# Patient Record
Sex: MALE | Race: WHITE | NOT HISPANIC OR LATINO
[De-identification: names, ages, dates, MRNs, and addresses within clinical notes are randomized per-mention and may not be internally consistent; named-entity substitution may affect disease eponyms.]

---

## 2017-02-07 ENCOUNTER — RX RENEWAL (OUTPATIENT)
Age: 72
End: 2017-02-07

## 2017-02-08 ENCOUNTER — RX RENEWAL (OUTPATIENT)
Age: 72
End: 2017-02-08

## 2017-03-03 ENCOUNTER — RX RENEWAL (OUTPATIENT)
Age: 72
End: 2017-03-03

## 2017-03-10 ENCOUNTER — APPOINTMENT (OUTPATIENT)
Dept: RHEUMATOLOGY | Facility: CLINIC | Age: 72
End: 2017-03-10

## 2017-03-10 VITALS — OXYGEN SATURATION: 96 % | HEART RATE: 66 BPM | SYSTOLIC BLOOD PRESSURE: 118 MMHG | DIASTOLIC BLOOD PRESSURE: 70 MMHG

## 2017-03-10 VITALS — OXYGEN SATURATION: 97 % | HEART RATE: 68 BPM

## 2017-03-10 DIAGNOSIS — G25.81 RESTLESS LEGS SYNDROME: ICD-10-CM

## 2017-03-10 DIAGNOSIS — M75.81 OTHER SHOULDER LESIONS, RIGHT SHOULDER: ICD-10-CM

## 2017-03-10 RX ORDER — METHYLPRED ACET/NACL,ISO-OS/PF 80 MG/ML
80 VIAL (ML) INJECTION
Qty: 1 | Refills: 0 | Status: COMPLETED | OUTPATIENT
Start: 2017-03-10

## 2017-03-10 RX ORDER — ROPINIROLE HYDROCHLORIDE 4 MG/1
4 TABLET, FILM COATED, EXTENDED RELEASE ORAL
Refills: 0 | Status: ACTIVE | COMMUNITY

## 2017-03-10 RX ORDER — DEXAMETHASONE SODIUM PHOSPHATE 4 MG/ML
4 INJECTION, SOLUTION INTRAMUSCULAR; INTRAVENOUS
Qty: 0 | Refills: 0 | Status: COMPLETED | OUTPATIENT
Start: 2017-03-10

## 2017-03-10 RX ADMIN — METHYLPREDNISOLONE ACETATE 0 MG/ML: 80 INJECTION, SUSPENSION INTRA-ARTICULAR; INTRALESIONAL; INTRAMUSCULAR; SOFT TISSUE at 00:00

## 2017-03-10 RX ADMIN — Medication MG/ML: at 00:00

## 2017-10-19 ENCOUNTER — RX RENEWAL (OUTPATIENT)
Age: 72
End: 2017-10-19

## 2018-05-10 ENCOUNTER — APPOINTMENT (OUTPATIENT)
Dept: RHEUMATOLOGY | Facility: CLINIC | Age: 73
End: 2018-05-10
Payer: MEDICARE

## 2018-05-10 VITALS
WEIGHT: 180 LBS | BODY MASS INDEX: 28.93 KG/M2 | HEIGHT: 66 IN | SYSTOLIC BLOOD PRESSURE: 140 MMHG | OXYGEN SATURATION: 98 % | HEART RATE: 70 BPM | DIASTOLIC BLOOD PRESSURE: 88 MMHG

## 2018-05-10 DIAGNOSIS — R05 COUGH: ICD-10-CM

## 2018-05-10 DIAGNOSIS — H61.20 IMPACTED CERUMEN, UNSPECIFIED EAR: ICD-10-CM

## 2018-05-10 DIAGNOSIS — H92.01 OTALGIA, RIGHT EAR: ICD-10-CM

## 2018-05-10 PROCEDURE — 99213 OFFICE O/P EST LOW 20 MIN: CPT

## 2018-11-07 ENCOUNTER — RECORD ABSTRACTING (OUTPATIENT)
Age: 73
End: 2018-11-07

## 2018-11-07 DIAGNOSIS — R91.8 OTHER NONSPECIFIC ABNORMAL FINDING OF LUNG FIELD: ICD-10-CM

## 2018-11-08 ENCOUNTER — APPOINTMENT (OUTPATIENT)
Dept: THORACIC SURGERY | Facility: CLINIC | Age: 73
End: 2018-11-08
Payer: MEDICARE

## 2018-11-08 VITALS
SYSTOLIC BLOOD PRESSURE: 156 MMHG | TEMPERATURE: 98 F | RESPIRATION RATE: 18 BRPM | WEIGHT: 168 LBS | BODY MASS INDEX: 27 KG/M2 | HEIGHT: 66 IN | DIASTOLIC BLOOD PRESSURE: 94 MMHG | HEART RATE: 88 BPM | OXYGEN SATURATION: 98 %

## 2018-11-08 DIAGNOSIS — Z86.39 PERSONAL HISTORY OF OTHER ENDOCRINE, NUTRITIONAL AND METABOLIC DISEASE: ICD-10-CM

## 2018-11-08 DIAGNOSIS — Z86.69 PERSONAL HISTORY OF OTHER DISEASES OF THE NERVOUS SYSTEM AND SENSE ORGANS: ICD-10-CM

## 2018-11-08 DIAGNOSIS — Z87.898 PERSONAL HISTORY OF OTHER SPECIFIED CONDITIONS: ICD-10-CM

## 2018-11-08 PROCEDURE — 99203 OFFICE O/P NEW LOW 30 MIN: CPT

## 2018-11-08 RX ORDER — PNV NO.95/FERROUS FUM/FOLIC AC 28MG-0.8MG
1000 TABLET ORAL
Refills: 0 | Status: ACTIVE | COMMUNITY
Start: 2018-11-08

## 2018-11-30 ENCOUNTER — OUTPATIENT (OUTPATIENT)
Dept: OUTPATIENT SERVICES | Facility: HOSPITAL | Age: 73
LOS: 1 days | Discharge: HOME | End: 2018-11-30
Payer: MEDICARE

## 2018-11-30 VITALS
OXYGEN SATURATION: 100 % | HEIGHT: 66 IN | HEART RATE: 100 BPM | DIASTOLIC BLOOD PRESSURE: 100 MMHG | WEIGHT: 167.99 LBS | SYSTOLIC BLOOD PRESSURE: 160 MMHG | RESPIRATION RATE: 16 BRPM | TEMPERATURE: 98 F

## 2018-11-30 DIAGNOSIS — R91.1 SOLITARY PULMONARY NODULE: ICD-10-CM

## 2018-11-30 DIAGNOSIS — Z98.890 OTHER SPECIFIED POSTPROCEDURAL STATES: Chronic | ICD-10-CM

## 2018-11-30 DIAGNOSIS — Z29.9 ENCOUNTER FOR PROPHYLACTIC MEASURES, UNSPECIFIED: ICD-10-CM

## 2018-11-30 LAB
ABO RH CONFIRMATION: SIGNIFICANT CHANGE UP
ANION GAP SERPL CALC-SCNC: 8 MMOL/L — SIGNIFICANT CHANGE UP (ref 5–17)
BASOPHILS # BLD AUTO: 0.04 K/UL — SIGNIFICANT CHANGE UP (ref 0–0.2)
BASOPHILS NFR BLD AUTO: 0.5 % — SIGNIFICANT CHANGE UP (ref 0–2)
BLD GP AB SCN SERPL QL: SIGNIFICANT CHANGE UP
BUN SERPL-MCNC: 17 MG/DL — SIGNIFICANT CHANGE UP (ref 7–23)
CALCIUM SERPL-MCNC: 8.9 MG/DL — SIGNIFICANT CHANGE UP (ref 8.5–10.1)
CHLORIDE SERPL-SCNC: 110 MMOL/L — HIGH (ref 96–108)
CO2 SERPL-SCNC: 25 MMOL/L — SIGNIFICANT CHANGE UP (ref 22–31)
CREAT SERPL-MCNC: 1.32 MG/DL — HIGH (ref 0.5–1.3)
EOSINOPHIL # BLD AUTO: 0.3 K/UL — SIGNIFICANT CHANGE UP (ref 0–0.5)
EOSINOPHIL NFR BLD AUTO: 3.8 % — SIGNIFICANT CHANGE UP (ref 0–6)
GLUCOSE SERPL-MCNC: 115 MG/DL — HIGH (ref 70–99)
HCT VFR BLD CALC: 36.1 % — LOW (ref 39–50)
HGB BLD-MCNC: 11.9 G/DL — LOW (ref 13–17)
IMM GRANULOCYTES NFR BLD AUTO: 0.3 % — SIGNIFICANT CHANGE UP (ref 0–1.5)
LYMPHOCYTES # BLD AUTO: 2.68 K/UL — SIGNIFICANT CHANGE UP (ref 1–3.3)
LYMPHOCYTES # BLD AUTO: 34.3 % — SIGNIFICANT CHANGE UP (ref 13–44)
MCHC RBC-ENTMCNC: 31.4 PG — SIGNIFICANT CHANGE UP (ref 27–34)
MCHC RBC-ENTMCNC: 33 GM/DL — SIGNIFICANT CHANGE UP (ref 32–36)
MCV RBC AUTO: 95.3 FL — SIGNIFICANT CHANGE UP (ref 80–100)
MONOCYTES # BLD AUTO: 1.07 K/UL — HIGH (ref 0–0.9)
MONOCYTES NFR BLD AUTO: 13.7 % — SIGNIFICANT CHANGE UP (ref 2–14)
NEUTROPHILS # BLD AUTO: 3.7 K/UL — SIGNIFICANT CHANGE UP (ref 1.8–7.4)
NEUTROPHILS NFR BLD AUTO: 47.4 % — SIGNIFICANT CHANGE UP (ref 43–77)
NRBC # BLD: 0 /100 WBCS — SIGNIFICANT CHANGE UP (ref 0–0)
PLATELET # BLD AUTO: 378 K/UL — SIGNIFICANT CHANGE UP (ref 150–400)
POTASSIUM SERPL-MCNC: 4.3 MMOL/L — SIGNIFICANT CHANGE UP (ref 3.5–5.3)
POTASSIUM SERPL-SCNC: 4.3 MMOL/L — SIGNIFICANT CHANGE UP (ref 3.5–5.3)
RBC # BLD: 3.79 M/UL — LOW (ref 4.2–5.8)
RBC # FLD: 14.6 % — HIGH (ref 10.3–14.5)
SODIUM SERPL-SCNC: 143 MMOL/L — SIGNIFICANT CHANGE UP (ref 135–145)
TYPE + AB SCN PNL BLD: SIGNIFICANT CHANGE UP
WBC # BLD: 7.81 K/UL — SIGNIFICANT CHANGE UP (ref 3.8–10.5)
WBC # FLD AUTO: 7.81 K/UL — SIGNIFICANT CHANGE UP (ref 3.8–10.5)

## 2018-11-30 PROCEDURE — 93010 ELECTROCARDIOGRAM REPORT: CPT

## 2018-11-30 NOTE — H&P PST ADULT - ASSESSMENT
73 year old male presents to PST for flex bronchoscopy right robotic assisted lung resection     Plan:  1. PST instructions given ; NPO post midnight   2. Pt instructed to take following meds with sip of water   3. EZ wash instructions given & mupirocin instructions given  4. Medical Evaluation with Dr CAPRINI SCORE [CLOT]    AGE RELATED RISK FACTORS                                                       MOBILITY RELATED FACTORS  [ ] Age 41-60 years                                            (1 Point)                  [ ] Bed rest                                                        (1 Point)  [ ] Age: 61-74 years                                           (2 Points)                 [ ] Plaster cast                                                   (2 Points)  [ ] Age= 75 years                                              (3 Points)                 [ ] Bed bound for more than 72 hours                 (2 Points)    DISEASE RELATED RISK FACTORS                                               GENDER SPECIFIC FACTORS  [ ] Edema in the lower extremities                       (1 Point)                  [ ] Pregnancy                                                     (1 Point)  [ ] Varicose veins                                               (1 Point)                  [ ] Post-partum < 6 weeks                                   (1 Point)             [ ] BMI > 25 Kg/m2                                            (1 Point)                  [ ] Hormonal therapy  or oral contraception          (1 Point)                 [ ] Sepsis (in the previous month)                        (1 Point)                  [ ] History of pregnancy complications                 (1 point)  [ ] Pneumonia or serious lung disease                                               [ ] Unexplained or recurrent                     (1 Point)           (in the previous month)                               (1 Point)  [ ] Abnormal pulmonary function test                     (1 Point)                 SURGERY RELATED RISK FACTORS  [ ] Acute myocardial infarction                              (1 Point)                 [ ]  Section                                             (1 Point)  [ ] Congestive heart failure (in the previous month)  (1 Point)               [ ] Minor surgery                                                  (1 Point)   [ ] Inflammatory bowel disease                             (1 Point)                 [ ] Arthroscopic surgery                                        (2 Points)  [ ] Central venous access                                      (2 Points)                [ ] General surgery lasting more than 45 minutes   (2 Points)       [ ] Stroke (in the previous month)                          (5 Points)               [ ] Elective arthroplasty                                         (5 Points)            ( ) past and present malignancy                             ( 2 points)                                                                                                                                    HEMATOLOGY RELATED FACTORS                                                 TRAUMA RELATED RISK FACTORS  [ ] Prior episodes of VTE                                     (3 Points)                 [ ] Fracture of the hip, pelvis, or leg                       (5 Points)  [ ] Positive family history for VTE                         (3 Points)                 [ ] Acute spinal cord injury (in the previous month)  (5 Points)  [ ] Prothrombin 69787 A                                     (3 Points)                 [ ] Paralysis  (less than 1 month)                             (5 Points)  [ ] Factor V Leiden                                             (3 Points)                  [ ] Multiple Trauma within 1 month                        (5 Points)  [ ] Lupus anticoagulants                                     (3 Points)                                                           [ ] Anticardiolipin antibodies                               (3 Points)                                                       [ ] High homocysteine in the blood                      (3 Points)                                             [ ] Other congenital or acquired thrombophilia      (3 Points)                                                [ ] Heparin induced thrombocytopenia                  (3 Points)                                          Total Score [          ] 73 year old male presents to PST for flex bronchoscopy right robotic assisted lung resection     Plan:  1. PST instructions given ; NPO post midnight   2. Pt instructed to take following meds with sip of water : bystolic losartan   3. EZ wash instructions given   4. Medical Evaluation with Dr Chandok CAPRINI SCORE [CLOT]    AGE RELATED RISK FACTORS                                                       MOBILITY RELATED FACTORS  [ ] Age 41-60 years                                            (1 Point)                  [ ] Bed rest                                                        (1 Point)  [ x] Age: 61-74 years                                           (2 Points)                 [ ] Plaster cast                                                   (2 Points)  [ ] Age= 75 years                                              (3 Points)                 [ ] Bed bound for more than 72 hours                 (2 Points)    DISEASE RELATED RISK FACTORS                                               GENDER SPECIFIC FACTORS  [ ] Edema in the lower extremities                       (1 Point)                  [ ] Pregnancy                                                     (1 Point)  [ ] Varicose veins                                               (1 Point)                  [ ] Post-partum < 6 weeks                                   (1 Point)             [x ] BMI > 25 Kg/m2                                            (1 Point)                  [ ] Hormonal therapy  or oral contraception          (1 Point)                 [ ] Sepsis (in the previous month)                        (1 Point)                  [ ] History of pregnancy complications                 (1 point)  [ ] Pneumonia or serious lung disease                                               [ ] Unexplained or recurrent                     (1 Point)           (in the previous month)                               (1 Point)  [ ] Abnormal pulmonary function test                     (1 Point)                 SURGERY RELATED RISK FACTORS  [ ] Acute myocardial infarction                              (1 Point)                 [ ]  Section                                             (1 Point)  [ ] Congestive heart failure (in the previous month)  (1 Point)               [ ] Minor surgery                                                  (1 Point)   [ ] Inflammatory bowel disease                             (1 Point)                 [ ] Arthroscopic surgery                                        (2 Points)  [ ] Central venous access                                      (2 Points)                [x ] General surgery lasting more than 45 minutes   (2 Points)       [ ] Stroke (in the previous month)                          (5 Points)               [ ] Elective arthroplasty                                         (5 Points)            ( ) past and present malignancy                             ( 2 points)                                                                                                                                    HEMATOLOGY RELATED FACTORS                                                 TRAUMA RELATED RISK FACTORS  [ ] Prior episodes of VTE                                     (3 Points)                 [ ] Fracture of the hip, pelvis, or leg                       (5 Points)  [ ] Positive family history for VTE                         (3 Points)                 [ ] Acute spinal cord injury (in the previous month)  (5 Points)  [ ] Prothrombin 35857 A                                     (3 Points)                 [ ] Paralysis  (less than 1 month)                             (5 Points)  [ ] Factor V Leiden                                             (3 Points)                  [ ] Multiple Trauma within 1 month                        (5 Points)  [ ] Lupus anticoagulants                                     (3 Points)                                                           [ ] Anticardiolipin antibodies                               (3 Points)                                                       [ ] High homocysteine in the blood                      (3 Points)                                             [ ] Other congenital or acquired thrombophilia      (3 Points)                                                [ ] Heparin induced thrombocytopenia                  (3 Points)                                          Total Score [     5     ]

## 2018-11-30 NOTE — H&P PST ADULT - PMH
Cervical radiculopathy    HLD (hyperlipidemia)    HTN (hypertension)    Low back pain    Lung nodule  right lung  Osteoarthritis    RLS (restless legs syndrome)    Shoulder pain Cervical radiculopathy    HLD (hyperlipidemia)    HTN (hypertension)    Low back pain    Lung nodule  right lung  Osteoarthritis    RLS (restless legs syndrome)    Shoulder pain    Umbilical hernia

## 2018-11-30 NOTE — H&P PST ADULT - HISTORY OF PRESENT ILLNESS
73 male presents to [facility] for flex bronchoscopy right robotic assisted lung resection 73 male PMH of RLS, cervical radiculopathy, HTN, HLD; Pt current smoker 15 pack year history ; Ct scan of chest showed right lung  presents to PST for flex bronchoscopy right robotic assisted lung resection

## 2018-12-07 ENCOUNTER — INPATIENT (INPATIENT)
Facility: HOSPITAL | Age: 73
LOS: 2 days | Discharge: HOME | End: 2018-12-10
Attending: THORACIC SURGERY (CARDIOTHORACIC VASCULAR SURGERY) | Admitting: THORACIC SURGERY (CARDIOTHORACIC VASCULAR SURGERY)
Payer: MEDICARE

## 2018-12-07 ENCOUNTER — RESULT REVIEW (OUTPATIENT)
Age: 73
End: 2018-12-07

## 2018-12-07 ENCOUNTER — APPOINTMENT (OUTPATIENT)
Dept: THORACIC SURGERY | Facility: HOSPITAL | Age: 73
End: 2018-12-07
Payer: MEDICARE

## 2018-12-07 VITALS
WEIGHT: 167.99 LBS | SYSTOLIC BLOOD PRESSURE: 154 MMHG | TEMPERATURE: 98 F | RESPIRATION RATE: 14 BRPM | HEART RATE: 79 BPM | OXYGEN SATURATION: 100 % | HEIGHT: 66 IN | DIASTOLIC BLOOD PRESSURE: 92 MMHG

## 2018-12-07 DIAGNOSIS — M54.12 RADICULOPATHY, CERVICAL REGION: ICD-10-CM

## 2018-12-07 DIAGNOSIS — M06.9 RHEUMATOID ARTHRITIS, UNSPECIFIED: ICD-10-CM

## 2018-12-07 DIAGNOSIS — K42.9 UMBILICAL HERNIA WITHOUT OBSTRUCTION OR GANGRENE: ICD-10-CM

## 2018-12-07 DIAGNOSIS — G89.29 OTHER CHRONIC PAIN: ICD-10-CM

## 2018-12-07 DIAGNOSIS — N18.3 CHRONIC KIDNEY DISEASE, STAGE 3 (MODERATE): ICD-10-CM

## 2018-12-07 DIAGNOSIS — G25.81 RESTLESS LEGS SYNDROME: ICD-10-CM

## 2018-12-07 DIAGNOSIS — D72.829 ELEVATED WHITE BLOOD CELL COUNT, UNSPECIFIED: ICD-10-CM

## 2018-12-07 DIAGNOSIS — Z98.890 OTHER SPECIFIED POSTPROCEDURAL STATES: Chronic | ICD-10-CM

## 2018-12-07 DIAGNOSIS — F17.210 NICOTINE DEPENDENCE, CIGARETTES, UNCOMPLICATED: ICD-10-CM

## 2018-12-07 DIAGNOSIS — D62 ACUTE POSTHEMORRHAGIC ANEMIA: ICD-10-CM

## 2018-12-07 DIAGNOSIS — I12.9 HYPERTENSIVE CHRONIC KIDNEY DISEASE WITH STAGE 1 THROUGH STAGE 4 CHRONIC KIDNEY DISEASE, OR UNSPECIFIED CHRONIC KIDNEY DISEASE: ICD-10-CM

## 2018-12-07 DIAGNOSIS — R91.1 SOLITARY PULMONARY NODULE: ICD-10-CM

## 2018-12-07 DIAGNOSIS — E78.5 HYPERLIPIDEMIA, UNSPECIFIED: ICD-10-CM

## 2018-12-07 LAB
ANION GAP SERPL CALC-SCNC: 8 MMOL/L — SIGNIFICANT CHANGE UP (ref 5–17)
BASOPHILS # BLD AUTO: 0.04 K/UL — SIGNIFICANT CHANGE UP (ref 0–0.2)
BASOPHILS NFR BLD AUTO: 0.2 % — SIGNIFICANT CHANGE UP (ref 0–2)
BUN SERPL-MCNC: 19 MG/DL — SIGNIFICANT CHANGE UP (ref 7–23)
CALCIUM SERPL-MCNC: 7.8 MG/DL — LOW (ref 8.5–10.1)
CHLORIDE SERPL-SCNC: 113 MMOL/L — HIGH (ref 96–108)
CO2 SERPL-SCNC: 20 MMOL/L — LOW (ref 22–31)
CREAT SERPL-MCNC: 1.59 MG/DL — HIGH (ref 0.5–1.3)
EOSINOPHIL # BLD AUTO: 0.09 K/UL — SIGNIFICANT CHANGE UP (ref 0–0.5)
EOSINOPHIL NFR BLD AUTO: 0.5 % — SIGNIFICANT CHANGE UP (ref 0–6)
GLUCOSE SERPL-MCNC: 156 MG/DL — HIGH (ref 70–99)
HCT VFR BLD CALC: 34 % — LOW (ref 39–50)
HGB BLD-MCNC: 11.2 G/DL — LOW (ref 13–17)
IMM GRANULOCYTES NFR BLD AUTO: 0.6 % — SIGNIFICANT CHANGE UP (ref 0–1.5)
LYMPHOCYTES # BLD AUTO: 1.84 K/UL — SIGNIFICANT CHANGE UP (ref 1–3.3)
LYMPHOCYTES # BLD AUTO: 9.7 % — LOW (ref 13–44)
MCHC RBC-ENTMCNC: 31.3 PG — SIGNIFICANT CHANGE UP (ref 27–34)
MCHC RBC-ENTMCNC: 32.9 GM/DL — SIGNIFICANT CHANGE UP (ref 32–36)
MCV RBC AUTO: 95 FL — SIGNIFICANT CHANGE UP (ref 80–100)
MONOCYTES # BLD AUTO: 0.55 K/UL — SIGNIFICANT CHANGE UP (ref 0–0.9)
MONOCYTES NFR BLD AUTO: 2.9 % — SIGNIFICANT CHANGE UP (ref 2–14)
NEUTROPHILS # BLD AUTO: 16.4 K/UL — HIGH (ref 1.8–7.4)
NEUTROPHILS NFR BLD AUTO: 86.1 % — HIGH (ref 43–77)
NRBC # BLD: 0 /100 WBCS — SIGNIFICANT CHANGE UP (ref 0–0)
PLATELET # BLD AUTO: 358 K/UL — SIGNIFICANT CHANGE UP (ref 150–400)
POTASSIUM SERPL-MCNC: 4.3 MMOL/L — SIGNIFICANT CHANGE UP (ref 3.5–5.3)
POTASSIUM SERPL-SCNC: 4.3 MMOL/L — SIGNIFICANT CHANGE UP (ref 3.5–5.3)
RBC # BLD: 3.58 M/UL — LOW (ref 4.2–5.8)
RBC # FLD: 14.5 % — SIGNIFICANT CHANGE UP (ref 10.3–14.5)
SODIUM SERPL-SCNC: 141 MMOL/L — SIGNIFICANT CHANGE UP (ref 135–145)
WBC # BLD: 19.04 K/UL — HIGH (ref 3.8–10.5)
WBC # FLD AUTO: 19.04 K/UL — HIGH (ref 3.8–10.5)

## 2018-12-07 PROCEDURE — 88331 PATH CONSLTJ SURG 1 BLK 1SPC: CPT | Mod: 26

## 2018-12-07 PROCEDURE — 31622 DX BRONCHOSCOPE/WASH: CPT

## 2018-12-07 PROCEDURE — 88307 TISSUE EXAM BY PATHOLOGIST: CPT | Mod: 26

## 2018-12-07 PROCEDURE — 32674 THORACOSCOPY LYMPH NODE EXC: CPT

## 2018-12-07 PROCEDURE — 88332 PATH CONSLTJ SURG EA ADD BLK: CPT | Mod: 26

## 2018-12-07 PROCEDURE — 88360 TUMOR IMMUNOHISTOCHEM/MANUAL: CPT | Mod: 26

## 2018-12-07 PROCEDURE — 32666 THORACOSCOPY W/WEDGE RESECT: CPT | Mod: AS

## 2018-12-07 PROCEDURE — 32674 THORACOSCOPY LYMPH NODE EXC: CPT | Mod: AS

## 2018-12-07 PROCEDURE — 71045 X-RAY EXAM CHEST 1 VIEW: CPT | Mod: 26

## 2018-12-07 PROCEDURE — 88305 TISSUE EXAM BY PATHOLOGIST: CPT | Mod: 26

## 2018-12-07 PROCEDURE — 88334 PATH CONSLTJ SURG CYTO XM EA: CPT | Mod: 26,59

## 2018-12-07 PROCEDURE — 32666 THORACOSCOPY W/WEDGE RESECT: CPT

## 2018-12-07 RX ORDER — DOCUSATE SODIUM 100 MG
100 CAPSULE ORAL THREE TIMES A DAY
Qty: 0 | Refills: 0 | Status: DISCONTINUED | OUTPATIENT
Start: 2018-12-07 | End: 2018-12-10

## 2018-12-07 RX ORDER — SENNA PLUS 8.6 MG/1
2 TABLET ORAL AT BEDTIME
Qty: 0 | Refills: 0 | Status: DISCONTINUED | OUTPATIENT
Start: 2018-12-07 | End: 2018-12-10

## 2018-12-07 RX ORDER — LANOLIN ALCOHOL/MO/W.PET/CERES
5 CREAM (GRAM) TOPICAL AT BEDTIME
Qty: 0 | Refills: 0 | Status: DISCONTINUED | OUTPATIENT
Start: 2018-12-07 | End: 2018-12-10

## 2018-12-07 RX ORDER — ROPINIROLE 8 MG/1
2 TABLET, FILM COATED, EXTENDED RELEASE ORAL AT BEDTIME
Qty: 0 | Refills: 0 | Status: DISCONTINUED | OUTPATIENT
Start: 2018-12-07 | End: 2018-12-10

## 2018-12-07 RX ORDER — SODIUM CHLORIDE 9 MG/ML
1000 INJECTION INTRAMUSCULAR; INTRAVENOUS; SUBCUTANEOUS
Qty: 0 | Refills: 0 | Status: DISCONTINUED | OUTPATIENT
Start: 2018-12-07 | End: 2018-12-07

## 2018-12-07 RX ORDER — NALOXONE HYDROCHLORIDE 4 MG/.1ML
0.1 SPRAY NASAL
Qty: 0 | Refills: 0 | Status: DISCONTINUED | OUTPATIENT
Start: 2018-12-07 | End: 2018-12-10

## 2018-12-07 RX ORDER — MORPHINE SULFATE 50 MG/1
2 CAPSULE, EXTENDED RELEASE ORAL
Qty: 0 | Refills: 0 | Status: DISCONTINUED | OUTPATIENT
Start: 2018-12-07 | End: 2018-12-10

## 2018-12-07 RX ORDER — ONDANSETRON 8 MG/1
4 TABLET, FILM COATED ORAL EVERY 6 HOURS
Qty: 0 | Refills: 0 | Status: DISCONTINUED | OUTPATIENT
Start: 2018-12-07 | End: 2018-12-10

## 2018-12-07 RX ORDER — ACETAMINOPHEN 500 MG
1000 TABLET ORAL ONCE
Qty: 0 | Refills: 0 | Status: COMPLETED | OUTPATIENT
Start: 2018-12-07 | End: 2018-12-09

## 2018-12-07 RX ORDER — HEPARIN SODIUM 5000 [USP'U]/ML
5000 INJECTION INTRAVENOUS; SUBCUTANEOUS EVERY 8 HOURS
Qty: 0 | Refills: 0 | Status: DISCONTINUED | OUTPATIENT
Start: 2018-12-08 | End: 2018-12-10

## 2018-12-07 RX ORDER — ONDANSETRON 8 MG/1
4 TABLET, FILM COATED ORAL ONCE
Qty: 0 | Refills: 0 | Status: DISCONTINUED | OUTPATIENT
Start: 2018-12-07 | End: 2018-12-07

## 2018-12-07 RX ORDER — HYDROMORPHONE HYDROCHLORIDE 2 MG/ML
0.5 INJECTION INTRAMUSCULAR; INTRAVENOUS; SUBCUTANEOUS
Qty: 0 | Refills: 0 | Status: DISCONTINUED | OUTPATIENT
Start: 2018-12-07 | End: 2018-12-10

## 2018-12-07 RX ORDER — ROPINIROLE 8 MG/1
1 TABLET, FILM COATED, EXTENDED RELEASE ORAL
Qty: 0 | Refills: 0 | Status: DISCONTINUED | OUTPATIENT
Start: 2018-12-07 | End: 2018-12-10

## 2018-12-07 RX ORDER — OXYCODONE AND ACETAMINOPHEN 5; 325 MG/1; MG/1
1 TABLET ORAL EVERY 4 HOURS
Qty: 0 | Refills: 0 | Status: DISCONTINUED | OUTPATIENT
Start: 2018-12-07 | End: 2018-12-10

## 2018-12-07 RX ORDER — HYDROMORPHONE HYDROCHLORIDE 2 MG/ML
30 INJECTION INTRAMUSCULAR; INTRAVENOUS; SUBCUTANEOUS
Qty: 0 | Refills: 0 | Status: DISCONTINUED | OUTPATIENT
Start: 2018-12-07 | End: 2018-12-10

## 2018-12-07 RX ORDER — LOSARTAN POTASSIUM 100 MG/1
25 TABLET, FILM COATED ORAL DAILY
Qty: 0 | Refills: 0 | Status: DISCONTINUED | OUTPATIENT
Start: 2018-12-07 | End: 2018-12-10

## 2018-12-07 RX ORDER — SODIUM CHLORIDE 9 MG/ML
1000 INJECTION, SOLUTION INTRAVENOUS
Qty: 0 | Refills: 0 | Status: DISCONTINUED | OUTPATIENT
Start: 2018-12-07 | End: 2018-12-07

## 2018-12-07 RX ORDER — ATORVASTATIN CALCIUM 80 MG/1
10 TABLET, FILM COATED ORAL AT BEDTIME
Qty: 0 | Refills: 0 | Status: DISCONTINUED | OUTPATIENT
Start: 2018-12-07 | End: 2018-12-10

## 2018-12-07 RX ORDER — OXYCODONE AND ACETAMINOPHEN 5; 325 MG/1; MG/1
2 TABLET ORAL EVERY 4 HOURS
Qty: 0 | Refills: 0 | Status: DISCONTINUED | OUTPATIENT
Start: 2018-12-07 | End: 2018-12-10

## 2018-12-07 RX ORDER — DULOXETINE HYDROCHLORIDE 30 MG/1
60 CAPSULE, DELAYED RELEASE ORAL DAILY
Qty: 0 | Refills: 0 | Status: DISCONTINUED | OUTPATIENT
Start: 2018-12-08 | End: 2018-12-10

## 2018-12-07 RX ADMIN — HYDROMORPHONE HYDROCHLORIDE 30 MILLILITER(S): 2 INJECTION INTRAMUSCULAR; INTRAVENOUS; SUBCUTANEOUS at 14:14

## 2018-12-07 RX ADMIN — ATORVASTATIN CALCIUM 10 MILLIGRAM(S): 80 TABLET, FILM COATED ORAL at 23:39

## 2018-12-07 RX ADMIN — Medication 5 MILLIGRAM(S): at 23:39

## 2018-12-07 RX ADMIN — SODIUM CHLORIDE 75 MILLILITER(S): 9 INJECTION INTRAMUSCULAR; INTRAVENOUS; SUBCUTANEOUS at 15:08

## 2018-12-07 RX ADMIN — HYDROMORPHONE HYDROCHLORIDE 0.5 MILLIGRAM(S): 2 INJECTION INTRAMUSCULAR; INTRAVENOUS; SUBCUTANEOUS at 14:53

## 2018-12-07 NOTE — CONSULT NOTE ADULT - CONSULT REASON
CTSP by Dr Botello for medical comanagement.    Robotic assistance in thoracoscopic procedure  12/07/2018  Flexible bronchoscopy, Robotic-Assisted VATS RUL wedge resection, mediastinal lymph node biopsy  Active  [email or web address].

## 2018-12-07 NOTE — ASU PREOP CHECKLIST - ASSESSMENT, HISTORY & PHYSICAL COMPLETED AND ON MEDICAL RECORD
I called and spoke with Rich and had her make an appointment per SBD notes in MRI report.    Kdaeem Núñez, ATC     done

## 2018-12-07 NOTE — BRIEF OPERATIVE NOTE - PROCEDURE
<<-----Click on this checkbox to enter Procedure Robotic assistance in thoracoscopic procedure  12/07/2018  Flexible bronchoscopy, Robotic-Assisted VATS RUL wedge resection, mediastinal lymph node biopsy  Active  [email or web address]

## 2018-12-07 NOTE — CONSULT NOTE ADULT - SUBJECTIVE AND OBJECTIVE BOX
Date of Service 18 @ 16:19    Patient is a 73y old  Male who presents with a chief complaint of " I have a lung nodule"    HPI: Pt is a 74 y/o male with h/o chronic Pain, rheumatoid arthritis, HTN, restless leg syndrome, pre diabetes, stage 3 CKD, hyperlipidemia, active smoker who was recently found to have RUL lung nodule.  He was evaluated by Dr Botello, pulmonary and admitted for elective VATs with RUL wedge resection.   Postop medicine consult called for comanagement by Dr Botello.  Pt seen in SDU c/o Rt chest wall pain from CT and surgery.  No SOB.        PAST MEDICAL & SURGICAL HISTORY:  Umbilical hernia  Osteoarthritis  Cervical radiculopathy  Shoulder pain  Low back pain  HLD (hyperlipidemia)  HTN (hypertension)  Lung nodule: right lung  RLS (restless legs syndrome)  H/O colonoscopy      Allergies    No Known Allergies    Intolerances        MEDICATIONS  (STANDING):  acetaminophen  IVPB .. 1000 milliGRAM(s) IV Intermittent once  docusate sodium 100 milliGRAM(s) Oral three times a day  HYDROmorphone PCA (1 mG/mL) 30 milliLiter(s) PCA Continuous PCA Continuous  losartan 25 milliGRAM(s) Oral daily  senna 2 Tablet(s) Oral at bedtime    MEDICATIONS  (PRN):  HYDROmorphone PCA (1 mG/mL) Rescue Clinician Bolus 0.5 milliGRAM(s) IV Push every 15 minutes PRN for Pain Scale GREATER THAN 6  morphine  - Injectable 2 milliGRAM(s) IV Push every 3 hours PRN Severe Pain (7 - 10)  naloxone Injectable 0.1 milliGRAM(s) IV Push every 3 minutes PRN For ANY of the following changes in patient status:  A. RR LESS THAN 10 breaths per minute, B. Oxygen saturation LESS THAN 90%, C. Sedation score of 6  ondansetron Injectable 4 milliGRAM(s) IV Push every 6 hours PRN Nausea  oxyCODONE    5 mG/acetaminophen 325 mG 1 Tablet(s) Oral every 4 hours PRN Mild Pain (1 - 3)  oxyCODONE    5 mG/acetaminophen 325 mG 2 Tablet(s) Oral every 4 hours PRN Moderate Pain (4 - 6)      FAMILY HISTORY: Mom-had severe rheumatoid arthritis  Dad- from heart attack at age 52.      Social History: , has a fiance  Disabled [employer]  Drinks weekly, smokes 1 ppd since age 17       Vital Signs Last 24 Hrs  T(C): 37.5 (07 Dec 2018 15:15), Max: 37.5 (07 Dec 2018 15:15)  T(F): 99.5 (07 Dec 2018 15:15), Max: 99.5 (07 Dec 2018 15:15)  HR: 74 (07 Dec 2018 15:15) (74 - 88)  BP: 141/73 (07 Dec 2018 15:15) (125/67 - 161/81)  BP(mean): --  RR: 14 (07 Dec 2018 15:15) (10 - 17)  SpO2: 100% (07 Dec 2018 15:15) (96% - 100%)    Daily Height in cm: 167.64 (07 Dec 2018 09:49)    Daily     I&O's Summary    07 Dec 2018 07:01  -  07 Dec 2018 16:19  --------------------------------------------------------  IN: 1482 mL / OUT: 215 mL / NET: 1267 mL          Data                          11.2   19.04 )-----------( 358      ( 07 Dec 2018 14:30 )             34.0       12-07    141  |  113<H>  |  19  ----------------------------<  156<H>  4.3   |  20<L>  |  1.59<H>    Ca    7.8<L>      07 Dec 2018 14:30

## 2018-12-07 NOTE — PROGRESS NOTE ADULT - SUBJECTIVE AND OBJECTIVE BOX
Post operative Note:    Patient has moderate pain, alleviated by PCA, requesting RLS medication as needed.    Vital Signs:  Vital Signs Last 24 Hrs  T(C): 36.5 (12-07-18 @ 16:01), Max: 37.5 (12-07-18 @ 15:15)  T(F): 97.7 (12-07-18 @ 16:01), Max: 99.5 (12-07-18 @ 15:15)  HR: 86 (12-07-18 @ 17:00) (74 - 88)  BP: 118/75 (12-07-18 @ 17:00) (118/75 - 161/81)  RR: 11 (12-07-18 @ 17:00) (10 - 17)  SpO2: 99% (12-07-18 @ 17:00) (96% - 100%) on NC    Telemetry/Alarms: sinus rhythm     Relevant labs, radiology and Medications reviewed                        11.2   19.04 )-----------( 358      ( 07 Dec 2018 14:30 )             34.0     12-07    141  |  113<H>  |  19  ----------------------------<  156<H>  4.3   |  20<L>  |  1.59<H>    Ca    7.8<L>      07 Dec 2018 14:30        MEDICATIONS  (STANDING):  acetaminophen  IVPB .. 1000 milliGRAM(s) IV Intermittent once  atorvastatin 10 milliGRAM(s) Oral at bedtime  docusate sodium 100 milliGRAM(s) Oral three times a day  DULoxetine 60 milliGRAM(s) Oral daily  HYDROmorphone PCA (1 mG/mL) 30 milliLiter(s) PCA Continuous PCA Continuous  losartan 25 milliGRAM(s) Oral daily  senna 2 Tablet(s) Oral at bedtime    MEDICATIONS  (PRN):  HYDROmorphone PCA (1 mG/mL) Rescue Clinician Bolus 0.5 milliGRAM(s) IV Push every 15 minutes PRN for Pain Scale GREATER THAN 6  morphine  - Injectable 2 milliGRAM(s) IV Push every 3 hours PRN Severe Pain (7 - 10)  naloxone Injectable 0.1 milliGRAM(s) IV Push every 3 minutes PRN For ANY of the following changes in patient status:  A. RR LESS THAN 10 breaths per minute, B. Oxygen saturation LESS THAN 90%, C. Sedation score of 6  ondansetron Injectable 4 milliGRAM(s) IV Push every 6 hours PRN Nausea  oxyCODONE    5 mG/acetaminophen 325 mG 1 Tablet(s) Oral every 4 hours PRN Mild Pain (1 - 3)  oxyCODONE    5 mG/acetaminophen 325 mG 2 Tablet(s) Oral every 4 hours PRN Moderate Pain (4 - 6)  rOPINIRole 1 milliGRAM(s) Oral two times a day PRN restless leg syndrome  rOPINIRole 2 milliGRAM(s) Oral at bedtime PRN Restless leg syndrome      Physical exam  Gen: NAD, breathing comfortably.  Neuro: AO x 3.  Card: S1 S2.  Pulm: Decreased breath sounds on the left. Mild crepitus.  Abd: Softly distended, nontender.  Ext: No edema.    Tubes: Right CT to suction, no air leak.    I&O's Summary    07 Dec 2018 07:01  -  07 Dec 2018 17:13  --------------------------------------------------------  IN: 1482 mL / OUT: 215 mL / NET: 1267 mL        Assessment  73y Male  w/ PAST MEDICAL & SURGICAL HISTORY:  Umbilical hernia  Osteoarthritis  Cervical radiculopathy  Shoulder pain  Low back pain  HLD (hyperlipidemia)  HTN (hypertension)  Lung nodule: right lung  RLS (restless legs syndrome)  H/O colonoscopy  admitted with Lung Nodule (07 Dec 2018 16:17)  .  On 12/7/18, patient underwent Robotic assistance in thoracoscopic procedure; RUL wedge resection, mediastinal lymph node biopsy and flexible bronchoscopy.      PLAN  Neuro: Pain management  Pulm: Encourage coughing, deep breathing and use of incentive spirometry. Wean off supplemental oxygen as able. Daily CXR.   Cardio: Monitor telemetry/alarms  GI: Tolerating diet. Continue stool softeners.  Renal: monitor urine output, supplement electrolytes as needed  Vasc: Heparin SC starting tomorrow.  Heme: Stable H/H.   Therapy: OOB/ambulate, PT  Tubes: Monitor Chest tube output, continue to suction.  D/C arterial line and cordoba tomorrow am @ 0600    Discussed with Cardiothoracic Team at AM rounds.

## 2018-12-08 LAB
ANION GAP SERPL CALC-SCNC: 9 MMOL/L — SIGNIFICANT CHANGE UP (ref 5–17)
BASOPHILS # BLD AUTO: 0 K/UL — SIGNIFICANT CHANGE UP (ref 0–0.2)
BASOPHILS NFR BLD AUTO: 0 % — SIGNIFICANT CHANGE UP (ref 0–2)
BUN SERPL-MCNC: 21 MG/DL — SIGNIFICANT CHANGE UP (ref 7–23)
CALCIUM SERPL-MCNC: 8 MG/DL — LOW (ref 8.5–10.1)
CHLORIDE SERPL-SCNC: 109 MMOL/L — HIGH (ref 96–108)
CK SERPL-CCNC: 270 U/L — SIGNIFICANT CHANGE UP (ref 26–308)
CO2 SERPL-SCNC: 21 MMOL/L — LOW (ref 22–31)
CREAT SERPL-MCNC: 1.55 MG/DL — HIGH (ref 0.5–1.3)
EOSINOPHIL # BLD AUTO: 0 K/UL — SIGNIFICANT CHANGE UP (ref 0–0.5)
EOSINOPHIL NFR BLD AUTO: 0 % — SIGNIFICANT CHANGE UP (ref 0–6)
GLUCOSE SERPL-MCNC: 92 MG/DL — SIGNIFICANT CHANGE UP (ref 70–99)
GRAM STN FLD: SIGNIFICANT CHANGE UP
HCT VFR BLD CALC: 32.1 % — LOW (ref 39–50)
HGB BLD-MCNC: 10.5 G/DL — LOW (ref 13–17)
LYMPHOCYTES # BLD AUTO: 12 % — LOW (ref 13–44)
LYMPHOCYTES # BLD AUTO: 2.15 K/UL — SIGNIFICANT CHANGE UP (ref 1–3.3)
MANUAL SMEAR VERIFICATION: SIGNIFICANT CHANGE UP
MCHC RBC-ENTMCNC: 30.9 PG — SIGNIFICANT CHANGE UP (ref 27–34)
MCHC RBC-ENTMCNC: 32.7 GM/DL — SIGNIFICANT CHANGE UP (ref 32–36)
MCV RBC AUTO: 94.4 FL — SIGNIFICANT CHANGE UP (ref 80–100)
MONOCYTES # BLD AUTO: 1.8 K/UL — HIGH (ref 0–0.9)
MONOCYTES NFR BLD AUTO: 10 % — SIGNIFICANT CHANGE UP (ref 2–14)
NEUTROPHILS # BLD AUTO: 13.82 K/UL — HIGH (ref 1.8–7.4)
NEUTROPHILS NFR BLD AUTO: 77 % — SIGNIFICANT CHANGE UP (ref 43–77)
NIGHT BLUE STAIN TISS: SIGNIFICANT CHANGE UP
NRBC # BLD: 0 /100 — SIGNIFICANT CHANGE UP (ref 0–0)
NRBC # BLD: SIGNIFICANT CHANGE UP /100 WBCS (ref 0–0)
PLAT MORPH BLD: NORMAL — SIGNIFICANT CHANGE UP
PLATELET # BLD AUTO: 357 K/UL — SIGNIFICANT CHANGE UP (ref 150–400)
POTASSIUM SERPL-MCNC: 4.3 MMOL/L — SIGNIFICANT CHANGE UP (ref 3.5–5.3)
POTASSIUM SERPL-SCNC: 4.3 MMOL/L — SIGNIFICANT CHANGE UP (ref 3.5–5.3)
RBC # BLD: 3.4 M/UL — LOW (ref 4.2–5.8)
RBC # FLD: 14.6 % — HIGH (ref 10.3–14.5)
RBC BLD AUTO: NORMAL — SIGNIFICANT CHANGE UP
SODIUM SERPL-SCNC: 139 MMOL/L — SIGNIFICANT CHANGE UP (ref 135–145)
SPECIMEN SOURCE: SIGNIFICANT CHANGE UP
SPECIMEN SOURCE: SIGNIFICANT CHANGE UP
TROPONIN I SERPL-MCNC: <0.015 NG/ML — SIGNIFICANT CHANGE UP (ref 0.01–0.04)
TROPONIN I SERPL-MCNC: <0.015 NG/ML — SIGNIFICANT CHANGE UP (ref 0.01–0.04)
VARIANT LYMPHS # BLD: 1 % — SIGNIFICANT CHANGE UP (ref 0–6)
WBC # BLD: 17.95 K/UL — HIGH (ref 3.8–10.5)
WBC # FLD AUTO: 17.95 K/UL — HIGH (ref 3.8–10.5)

## 2018-12-08 PROCEDURE — 99232 SBSQ HOSP IP/OBS MODERATE 35: CPT

## 2018-12-08 PROCEDURE — 93010 ELECTROCARDIOGRAM REPORT: CPT

## 2018-12-08 PROCEDURE — 71045 X-RAY EXAM CHEST 1 VIEW: CPT | Mod: 26

## 2018-12-08 RX ORDER — ACETAMINOPHEN 500 MG
1000 TABLET ORAL ONCE
Qty: 0 | Refills: 0 | Status: COMPLETED | OUTPATIENT
Start: 2018-12-08 | End: 2018-12-09

## 2018-12-08 RX ORDER — LIDOCAINE 4 G/100G
1 CREAM TOPICAL DAILY
Qty: 0 | Refills: 0 | Status: DISCONTINUED | OUTPATIENT
Start: 2018-12-08 | End: 2018-12-10

## 2018-12-08 RX ADMIN — HEPARIN SODIUM 5000 UNIT(S): 5000 INJECTION INTRAVENOUS; SUBCUTANEOUS at 07:09

## 2018-12-08 RX ADMIN — Medication 100 MILLIGRAM(S): at 21:08

## 2018-12-08 RX ADMIN — ATORVASTATIN CALCIUM 10 MILLIGRAM(S): 80 TABLET, FILM COATED ORAL at 21:09

## 2018-12-08 RX ADMIN — SENNA PLUS 2 TABLET(S): 8.6 TABLET ORAL at 21:08

## 2018-12-08 RX ADMIN — LIDOCAINE 1 PATCH: 4 CREAM TOPICAL at 12:48

## 2018-12-08 RX ADMIN — LIDOCAINE 1 PATCH: 4 CREAM TOPICAL at 20:38

## 2018-12-08 RX ADMIN — ROPINIROLE 2 MILLIGRAM(S): 8 TABLET, FILM COATED, EXTENDED RELEASE ORAL at 20:40

## 2018-12-08 RX ADMIN — HEPARIN SODIUM 5000 UNIT(S): 5000 INJECTION INTRAVENOUS; SUBCUTANEOUS at 17:25

## 2018-12-08 RX ADMIN — HEPARIN SODIUM 5000 UNIT(S): 5000 INJECTION INTRAVENOUS; SUBCUTANEOUS at 21:08

## 2018-12-08 NOTE — CONSULT NOTE ADULT - SUBJECTIVE AND OBJECTIVE BOX
HPI:    73 y.o.m with h/o chronic Pain, rheumatoid arthritis, HTN, restless leg syndrome, pre diabetes, stage 3 CKD, hyperlipidemia, active smoker who was recently found to have RUL lung nodule.  He is s/p elective VATs with RUL wedge resection. pat c/o Rt chest wall pain from CT and surgery.  No SOB.  pat lying in bed.    PAST MEDICAL & SURGICAL HISTORY:  Umbilical hernia  Osteoarthritis  Cervical radiculopathy  Shoulder pain  Low back pain  HLD (hyperlipidemia)  HTN (hypertension)  Lung nodule: right lung  RLS (restless legs syndrome)  H/O colonoscopy      Home Medications:  aspirin 81 mg oral tablet: 1 tab(s) orally once a day (07 Dec 2018 10:39)  aspirin 81 mg oral tablet: 1 tab(s) orally once a day (07 Dec 2018 10:39)  Bystolic 10 mg oral tablet: 1 tab(s) orally once a day (07 Dec 2018 10:39)  Fish Oil oral capsule:  (07 Dec 2018 10:39)  HYDROcodone-acetaminophen 10 mg-325 mg oral tablet: 1 tab(s) orally HS (07 Dec 2018 10:39)  losartan 25 mg oral tablet: 1 tab(s) orally once a day (07 Dec 2018 10:39)  Multiple Vitamins oral tablet: 1 tab(s) orally once a day (07 Dec 2018 10:39)  Narcof: 10 milligram(s) orally , As Needed (07 Dec 2018 10:39)  Requip 2 mg oral tablet: 1 tab(s) orally 3 times a day, As Needed (07 Dec 2018 10:39)  Requip 4 mg oral tablet: 1 tab(s) orally 3 times a day, As Needed (07 Dec 2018 10:39)      MEDICATIONS  (STANDING):  acetaminophen  IVPB .. 1000 milliGRAM(s) IV Intermittent once  atorvastatin 10 milliGRAM(s) Oral at bedtime  docusate sodium 100 milliGRAM(s) Oral three times a day  DULoxetine 60 milliGRAM(s) Oral daily  heparin  Injectable 5000 Unit(s) SubCutaneous every 8 hours  HYDROmorphone PCA (1 mG/mL) 30 milliLiter(s) PCA Continuous PCA Continuous  lidocaine   Patch 1 Patch Transdermal daily  losartan 25 milliGRAM(s) Oral daily  senna 2 Tablet(s) Oral at bedtime    MEDICATIONS  (PRN):  HYDROmorphone PCA (1 mG/mL) Rescue Clinician Bolus 0.5 milliGRAM(s) IV Push every 15 minutes PRN for Pain Scale GREATER THAN 6  melatonin 5 milliGRAM(s) Oral at bedtime PRN Sleep  morphine  - Injectable 2 milliGRAM(s) IV Push every 3 hours PRN Severe Pain (7 - 10)  naloxone Injectable 0.1 milliGRAM(s) IV Push every 3 minutes PRN For ANY of the following changes in patient status:  A. RR LESS THAN 10 breaths per minute, B. Oxygen saturation LESS THAN 90%, C. Sedation score of 6  ondansetron Injectable 4 milliGRAM(s) IV Push every 6 hours PRN Nausea  oxyCODONE    5 mG/acetaminophen 325 mG 1 Tablet(s) Oral every 4 hours PRN Mild Pain (1 - 3)  oxyCODONE    5 mG/acetaminophen 325 mG 2 Tablet(s) Oral every 4 hours PRN Moderate Pain (4 - 6)  rOPINIRole 1 milliGRAM(s) Oral two times a day PRN restless leg syndrome  rOPINIRole 2 milliGRAM(s) Oral at bedtime PRN Restless leg syndrome      Allergies    No Known Allergies    Intolerances        SOCIAL HISTORY: Denies tobacco, etoh abuse or illicit drug use    FAMILY HISTORY:      Vital Signs Last 24 Hrs  T(C): 36.8 (08 Dec 2018 09:54), Max: 37.5 (07 Dec 2018 15:15)  T(F): 98.2 (08 Dec 2018 09:54), Max: 99.5 (07 Dec 2018 15:15)  HR: 84 (08 Dec 2018 12:00) (72 - 99)  BP: 119/71 (08 Dec 2018 12:00) (104/60 - 161/81)  BP(mean): 80 (08 Dec 2018 12:00) (70 - 90)  RR: 13 (08 Dec 2018 12:00) (8 - 26)  SpO2: 98% (08 Dec 2018 12:00) (88% - 100%)        REVIEW OF SYSTEMS:    CONSTITUTIONAL:  As per HPI.  HEENT:  Eyes:  No diplopia or blurred vision. ENT:  No earache, sore throat or runny nose.  CARDIOVASCULAR:  No pressure, squeezing, tightness, heaviness or aching about the chest, neck, axilla or epigastrium.  RESPIRATORY:  No cough, shortness of breath, PND or orthopnea.  GASTROINTESTINAL:  No nausea, vomiting or diarrhea.  GENITOURINARY:  No dysuria, frequency or urgency.  MUSCULOSKELETAL:  As per HPI.  SKIN:  No change in skin, hair or nails.  NEUROLOGIC:  No paresthesias, fasciculations, seizures or weakness.  PSYCHIATRIC:  No disorder of thought or mood.  ENDOCRINE:  No heat or cold intolerance, polyuria or polydipsia.  HEMATOLOGICAL:  No easy bruising or bleedings:  .     PHYSICAL EXAMINATION:    GENERAL APPEARANCE:  Pt. is not currently dyspneic, in no distress. Pt. is alert, oriented, and pleasant.  HEENT:  Pupils are normal and react normally. No icterus. Mucous membranes well colored.  NECK:  Supple. No lymphadenopathy. Jugular venous pressure not elevated. Carotids equal.   HEART:   The cardiac impulse has a normal quality. Regular. Normal S1 and S2. There are no murmurs, rubs or gallops noted  CHEST:  Chest is clear to auscultation. Normal respiratory effort.  ABDOMEN:  Soft and nontender.   EXTREMITIES:  There is no cyanosis, clubbing or edema.   SKIN:  No rash or significant lesions are noted.    LABS:                        10.5   17.95 )-----------( 357      ( 08 Dec 2018 05:20 )             32.1     12-08    139  |  109<H>  |  21  ----------------------------<  92  4.3   |  21<L>  |  1.55<H>    Ca    8.0<L>      08 Dec 2018 05:20    CARDIAC MARKERS ( 08 Dec 2018 10:33 )  <0.015 ng/mL / x     / 270 U/L / x     / x        Culture - Tissue with Gram Stain (collected 12-07-18 @ 11:35)  Source: .Tissue right upper lung wedge resection  Gram Stain (12-08-18 @ 02:50):    No polymorphonuclear cells seen    No organisms seen    RADIOLOGY & ADDITIONAL STUDIES:     Chest 1 View AP/PA. (12.08.18 @ 08:54) >    Impression:    Right-sided chest tube in place. No evidence of pneumothorax.

## 2018-12-08 NOTE — CONSULT NOTE ADULT - ASSESSMENT
PROBLEMS:    RUL Lung Nodule-s/p wedge resection  Hyperlipidemia  HTN  Restless Leg Syndrome  Chronic Pain  Stage 3 with proteinuria    PLAN:    pain control  incentive spirometry  supportive care  CT surgery fu  DVT prophylasix
Pt is a 72 y/o male with h/o chronic Pain, rheumatoid arthritis, HTN, restless leg syndrome, pre diabetes, stage 3 CKD, hyperlipidemia, active smoker who was recently found to have RUL lung nodule.  He was evaluated by Dr Botello, pulmonary and admitted for elective VATs with RUL wedge resection.   Postop medicine consult called for comanagement by Dr Botello.      * RUL Lung Nodule-s/p wedge resection, await official path, continue pain control, CT management per Dr Botello, encourage use of incentive spirometry, monitor postop labs.    * Leukocytosis-reactive from surgery, monitor for now.     * Hyperlipidemia lipitor      *HTN-losartan     * Restless Leg Syndrome-better controlled on requip 1/2 tablet of 2 mg twice a day as needed and 1 tablet at night as needed, not to exceed 4 mg per day.    * Chronic Pain-hold norco while on pca, continue cymbalta 60 mg     * Stage 3 with proteinuria-monitor renal fx, continue losartan     * Proph- heparin    * Disp-OOB, PT when able    * Comm-d/w pt, RN, figina, Dr Botello

## 2018-12-08 NOTE — PROGRESS NOTE ADULT - SUBJECTIVE AND OBJECTIVE BOX
Subjective: Patient complaining of pain at CT site and sternum.  Dr. Fiore called, r/o cardiac etiology.    Vital Signs:  Vital Signs Last 24 Hrs  T(C): 36.8 (12-08-18 @ 09:54), Max: 37.5 (12-07-18 @ 15:15)  T(F): 98.2 (12-08-18 @ 09:54), Max: 99.5 (12-07-18 @ 15:15)  HR: 81 (12-08-18 @ 10:07) (72 - 99)  BP: 131/67 (12-08-18 @ 10:07) (104/60 - 161/81)  RR: 18 (12-08-18 @ 10:07) (8 - 26)  SpO2: 97% (12-08-18 @ 10:07) (88% - 100%) on NC    Telemetry/Alarms: sinus    Relevant labs, radiology and Medications reviewed                        10.5   17.95 )-----------( 357      ( 08 Dec 2018 05:20 )             32.1     12-08    139  |  109<H>  |  21  ----------------------------<  92  4.3   |  21<L>  |  1.55<H>    Ca    8.0<L>      08 Dec 2018 05:20        MEDICATIONS  (STANDING):  acetaminophen  IVPB .. 1000 milliGRAM(s) IV Intermittent once  atorvastatin 10 milliGRAM(s) Oral at bedtime  docusate sodium 100 milliGRAM(s) Oral three times a day  DULoxetine 60 milliGRAM(s) Oral daily  heparin  Injectable 5000 Unit(s) SubCutaneous every 8 hours  HYDROmorphone PCA (1 mG/mL) 30 milliLiter(s) PCA Continuous PCA Continuous  lidocaine   Patch 1 Patch Transdermal daily  losartan 25 milliGRAM(s) Oral daily  senna 2 Tablet(s) Oral at bedtime    MEDICATIONS  (PRN):  HYDROmorphone PCA (1 mG/mL) Rescue Clinician Bolus 0.5 milliGRAM(s) IV Push every 15 minutes PRN for Pain Scale GREATER THAN 6  melatonin 5 milliGRAM(s) Oral at bedtime PRN Sleep  morphine  - Injectable 2 milliGRAM(s) IV Push every 3 hours PRN Severe Pain (7 - 10)  naloxone Injectable 0.1 milliGRAM(s) IV Push every 3 minutes PRN For ANY of the following changes in patient status:  A. RR LESS THAN 10 breaths per minute, B. Oxygen saturation LESS THAN 90%, C. Sedation score of 6  ondansetron Injectable 4 milliGRAM(s) IV Push every 6 hours PRN Nausea  oxyCODONE    5 mG/acetaminophen 325 mG 1 Tablet(s) Oral every 4 hours PRN Mild Pain (1 - 3)  oxyCODONE    5 mG/acetaminophen 325 mG 2 Tablet(s) Oral every 4 hours PRN Moderate Pain (4 - 6)  rOPINIRole 1 milliGRAM(s) Oral two times a day PRN restless leg syndrome  rOPINIRole 2 milliGRAM(s) Oral at bedtime PRN Restless leg syndrome      Physical exam  Gen: NAD, breathing comfortably.  Neuro: Ao x 3.  Card: S1 S2.  Pulm: Decreased breath sounds on the right.  Abd: soft NT ND.  Ext: No edema.    Tubes: No air leak.    I&O's Summary    07 Dec 2018 07:01  -  08 Dec 2018 07:00  --------------------------------------------------------  IN: 1482 mL / OUT: 1430 mL / NET: 52 mL        Assessment  73y Male  w/ PAST MEDICAL & SURGICAL HISTORY:  Umbilical hernia  Osteoarthritis  Cervical radiculopathy  Shoulder pain  Low back pain  HLD (hyperlipidemia)  HTN (hypertension)  Lung nodule: right lung  RLS (restless legs syndrome)  H/O colonoscopy  admitted with Lung nodule (08 Dec 2018 09:20)  .  12/7/18 patient underwent Robotic assistance in thoracoscopic procedure right upper lobe wedge.      PLAN  Pain control.  OOB ambulate.  Resume home medications.  CT to water seal.

## 2018-12-08 NOTE — CHART NOTE - NSCHARTNOTEFT_GEN_A_CORE
Pt c/o CP with inspiration, EKG nsr with no acute changes, CE negative.  Pt feels better, monitor, mostly likely due to VATs and CT, monitor for now, trend CE, pain control.  Pt re-examined by me.

## 2018-12-09 LAB — TROPONIN I SERPL-MCNC: <0.015 NG/ML — SIGNIFICANT CHANGE UP (ref 0.01–0.04)

## 2018-12-09 RX ORDER — METOPROLOL TARTRATE 50 MG
25 TABLET ORAL
Qty: 0 | Refills: 0 | Status: DISCONTINUED | OUTPATIENT
Start: 2018-12-09 | End: 2018-12-10

## 2018-12-09 RX ADMIN — LOSARTAN POTASSIUM 25 MILLIGRAM(S): 100 TABLET, FILM COATED ORAL at 05:19

## 2018-12-09 RX ADMIN — HEPARIN SODIUM 5000 UNIT(S): 5000 INJECTION INTRAVENOUS; SUBCUTANEOUS at 13:58

## 2018-12-09 RX ADMIN — LIDOCAINE 1 PATCH: 4 CREAM TOPICAL at 12:15

## 2018-12-09 RX ADMIN — ATORVASTATIN CALCIUM 10 MILLIGRAM(S): 80 TABLET, FILM COATED ORAL at 21:31

## 2018-12-09 RX ADMIN — HEPARIN SODIUM 5000 UNIT(S): 5000 INJECTION INTRAVENOUS; SUBCUTANEOUS at 21:34

## 2018-12-09 RX ADMIN — DULOXETINE HYDROCHLORIDE 60 MILLIGRAM(S): 30 CAPSULE, DELAYED RELEASE ORAL at 12:16

## 2018-12-09 RX ADMIN — Medication 1000 MILLIGRAM(S): at 22:00

## 2018-12-09 RX ADMIN — Medication 1000 MILLIGRAM(S): at 12:54

## 2018-12-09 RX ADMIN — LIDOCAINE 1 PATCH: 4 CREAM TOPICAL at 21:25

## 2018-12-09 RX ADMIN — Medication 100 MILLIGRAM(S): at 05:19

## 2018-12-09 RX ADMIN — LIDOCAINE 1 PATCH: 4 CREAM TOPICAL at 07:00

## 2018-12-09 RX ADMIN — Medication 400 MILLIGRAM(S): at 21:33

## 2018-12-09 RX ADMIN — Medication 400 MILLIGRAM(S): at 12:17

## 2018-12-09 RX ADMIN — Medication 25 MILLIGRAM(S): at 12:17

## 2018-12-09 RX ADMIN — HEPARIN SODIUM 5000 UNIT(S): 5000 INJECTION INTRAVENOUS; SUBCUTANEOUS at 05:19

## 2018-12-09 RX ADMIN — Medication 25 MILLIGRAM(S): at 18:07

## 2018-12-09 RX ADMIN — LIDOCAINE 1 PATCH: 4 CREAM TOPICAL at 23:30

## 2018-12-09 NOTE — PROGRESS NOTE ADULT - SUBJECTIVE AND OBJECTIVE BOX
Pt is c/o mild Rt chest wall pain from CT.  CE negative overnight, sitting up in chair, no SOB.    Date of Service: 12-09-18 @ 11:30    Vital Signs Last 24 Hrs  T(C): 36.6 (09 Dec 2018 05:38), Max: 36.7 (08 Dec 2018 16:12)  T(F): 97.8 (09 Dec 2018 05:38), Max: 98 (08 Dec 2018 16:12)  HR: 80 (09 Dec 2018 10:00) (70 - 96)  BP: 155/82 (09 Dec 2018 10:00) (113/67 - 171/76)  BP(mean): 101 (09 Dec 2018 10:00) (79 - 101)  RR: 14 (09 Dec 2018 10:00) (8 - 22)  SpO2: 100% (09 Dec 2018 10:00) (85% - 100%)    Daily     Daily     I&O's Detail    08 Dec 2018 07:01  -  09 Dec 2018 07:00  --------------------------------------------------------  IN:  Total IN: 0 mL    OUT:    Chest Tube: 65 mL    Voided: 1675 mL  Total OUT: 1740 mL    Total NET: -1740 mL          CAPILLARY BLOOD GLUCOSE              CARDIAC MARKERS ( 08 Dec 2018 23:33 )  <0.015 ng/mL / x     / x     / x     / x      CARDIAC MARKERS ( 08 Dec 2018 19:57 )  <0.015 ng/mL / x     / x     / x     / x      CARDIAC MARKERS ( 08 Dec 2018 10:33 )  <0.015 ng/mL / x     / 270 U/L / x     / x                                  10.5   17.95 )-----------( 357      ( 08 Dec 2018 05:20 )             32.1       12-08    139  |  109<H>  |  21  ----------------------------<  92  4.3   |  21<L>  |  1.55<H>    Ca    8.0<L>      08 Dec 2018 05:20                        MEDICATIONS  (STANDING):  acetaminophen  IVPB .. 1000 milliGRAM(s) IV Intermittent once  acetaminophen  IVPB .. 1000 milliGRAM(s) IV Intermittent once  atorvastatin 10 milliGRAM(s) Oral at bedtime  docusate sodium 100 milliGRAM(s) Oral three times a day  DULoxetine 60 milliGRAM(s) Oral daily  heparin  Injectable 5000 Unit(s) SubCutaneous every 8 hours  HYDROmorphone PCA (1 mG/mL) 30 milliLiter(s) PCA Continuous PCA Continuous  lidocaine   Patch 1 Patch Transdermal daily  losartan 25 milliGRAM(s) Oral daily  metoprolol tartrate 25 milliGRAM(s) Oral two times a day  senna 2 Tablet(s) Oral at bedtime    MEDICATIONS  (PRN):  HYDROmorphone PCA (1 mG/mL) Rescue Clinician Bolus 0.5 milliGRAM(s) IV Push every 15 minutes PRN for Pain Scale GREATER THAN 6  melatonin 5 milliGRAM(s) Oral at bedtime PRN Sleep  morphine  - Injectable 2 milliGRAM(s) IV Push every 3 hours PRN Severe Pain (7 - 10)  naloxone Injectable 0.1 milliGRAM(s) IV Push every 3 minutes PRN For ANY of the following changes in patient status:  A. RR LESS THAN 10 breaths per minute, B. Oxygen saturation LESS THAN 90%, C. Sedation score of 6  ondansetron Injectable 4 milliGRAM(s) IV Push every 6 hours PRN Nausea  oxyCODONE    5 mG/acetaminophen 325 mG 1 Tablet(s) Oral every 4 hours PRN Mild Pain (1 - 3)  oxyCODONE    5 mG/acetaminophen 325 mG 2 Tablet(s) Oral every 4 hours PRN Moderate Pain (4 - 6)  rOPINIRole 1 milliGRAM(s) Oral two times a day PRN restless leg syndrome  rOPINIRole 2 milliGRAM(s) Oral at bedtime PRN Restless leg syndrome

## 2018-12-09 NOTE — PROGRESS NOTE ADULT - SUBJECTIVE AND OBJECTIVE BOX
Subjective:    pat lying in bed, better, still having pain @ surgery site.    Home Medications:  aspirin 81 mg oral tablet: 1 tab(s) orally once a day (07 Dec 2018 10:39)  aspirin 81 mg oral tablet: 1 tab(s) orally once a day (07 Dec 2018 10:39)  Bystolic 10 mg oral tablet: 1 tab(s) orally once a day (07 Dec 2018 10:39)  Fish Oil oral capsule:  (07 Dec 2018 10:39)  HYDROcodone-acetaminophen 10 mg-325 mg oral tablet: 1 tab(s) orally HS (07 Dec 2018 10:39)  losartan 25 mg oral tablet: 1 tab(s) orally once a day (07 Dec 2018 10:39)  Multiple Vitamins oral tablet: 1 tab(s) orally once a day (07 Dec 2018 10:39)  Narcof: 10 milligram(s) orally , As Needed (07 Dec 2018 10:39)  Requip 2 mg oral tablet: 1 tab(s) orally 3 times a day, As Needed (07 Dec 2018 10:39)  Requip 4 mg oral tablet: 1 tab(s) orally 3 times a day, As Needed (07 Dec 2018 10:39)    MEDICATIONS  (STANDING):  acetaminophen  IVPB .. 1000 milliGRAM(s) IV Intermittent once  acetaminophen  IVPB .. 1000 milliGRAM(s) IV Intermittent once  atorvastatin 10 milliGRAM(s) Oral at bedtime  docusate sodium 100 milliGRAM(s) Oral three times a day  DULoxetine 60 milliGRAM(s) Oral daily  heparin  Injectable 5000 Unit(s) SubCutaneous every 8 hours  HYDROmorphone PCA (1 mG/mL) 30 milliLiter(s) PCA Continuous PCA Continuous  lidocaine   Patch 1 Patch Transdermal daily  losartan 25 milliGRAM(s) Oral daily  senna 2 Tablet(s) Oral at bedtime    MEDICATIONS  (PRN):  HYDROmorphone PCA (1 mG/mL) Rescue Clinician Bolus 0.5 milliGRAM(s) IV Push every 15 minutes PRN for Pain Scale GREATER THAN 6  melatonin 5 milliGRAM(s) Oral at bedtime PRN Sleep  morphine  - Injectable 2 milliGRAM(s) IV Push every 3 hours PRN Severe Pain (7 - 10)  naloxone Injectable 0.1 milliGRAM(s) IV Push every 3 minutes PRN For ANY of the following changes in patient status:  A. RR LESS THAN 10 breaths per minute, B. Oxygen saturation LESS THAN 90%, C. Sedation score of 6  ondansetron Injectable 4 milliGRAM(s) IV Push every 6 hours PRN Nausea  oxyCODONE    5 mG/acetaminophen 325 mG 1 Tablet(s) Oral every 4 hours PRN Mild Pain (1 - 3)  oxyCODONE    5 mG/acetaminophen 325 mG 2 Tablet(s) Oral every 4 hours PRN Moderate Pain (4 - 6)  rOPINIRole 1 milliGRAM(s) Oral two times a day PRN restless leg syndrome  rOPINIRole 2 milliGRAM(s) Oral at bedtime PRN Restless leg syndrome      Allergies    No Known Allergies    Intolerances        Vital Signs Last 24 Hrs  T(C): 36.6 (09 Dec 2018 05:38), Max: 36.7 (08 Dec 2018 16:12)  T(F): 97.8 (09 Dec 2018 05:38), Max: 98 (08 Dec 2018 16:12)  HR: 80 (09 Dec 2018 10:00) (70 - 96)  BP: 155/82 (09 Dec 2018 10:00) (113/67 - 171/76)  BP(mean): 101 (09 Dec 2018 10:00) (79 - 101)  RR: 14 (09 Dec 2018 10:00) (8 - 22)  SpO2: 100% (09 Dec 2018 10:00) (85% - 100%)      PHYSICAL EXAMINATION:    NECK:  Supple. No lymphadenopathy. Jugular venous pressure not elevated. Carotids equal.   HEART:   The cardiac impulse has a normal quality. Reg., Nl S1 and S2.  There are no murmurs, rubs or gallops noted  CHEST:  Chest crackles to auscultation. Normal respiratory effort.  ABDOMEN:  Soft and nontender.   EXTREMITIES:  There is no edema.       LABS:                        10.5   17.95 )-----------( 357      ( 08 Dec 2018 05:20 )             32.1     12-08    139  |  109<H>  |  21  ----------------------------<  92  4.3   |  21<L>  |  1.55<H>    Ca    8.0<L>      08 Dec 2018 05:20      Xray Chest 1 View AP/PA. (12.08.18 @ 08:54) >  Impression:    Right-sided chest tube in place. No evidence of pneumothorax.

## 2018-12-10 ENCOUNTER — TRANSCRIPTION ENCOUNTER (OUTPATIENT)
Age: 73
End: 2018-12-10

## 2018-12-10 VITALS
SYSTOLIC BLOOD PRESSURE: 138 MMHG | RESPIRATION RATE: 17 BRPM | OXYGEN SATURATION: 97 % | HEART RATE: 69 BPM | DIASTOLIC BLOOD PRESSURE: 64 MMHG | TEMPERATURE: 98 F

## 2018-12-10 LAB
ANION GAP SERPL CALC-SCNC: 9 MMOL/L — SIGNIFICANT CHANGE UP (ref 5–17)
BUN SERPL-MCNC: 17 MG/DL — SIGNIFICANT CHANGE UP (ref 7–23)
CALCIUM SERPL-MCNC: 8.4 MG/DL — LOW (ref 8.5–10.1)
CHLORIDE SERPL-SCNC: 111 MMOL/L — HIGH (ref 96–108)
CO2 SERPL-SCNC: 21 MMOL/L — LOW (ref 22–31)
CREAT SERPL-MCNC: 1.23 MG/DL — SIGNIFICANT CHANGE UP (ref 0.5–1.3)
GLUCOSE SERPL-MCNC: 120 MG/DL — HIGH (ref 70–99)
HCT VFR BLD CALC: 33.1 % — LOW (ref 39–50)
HGB BLD-MCNC: 10.9 G/DL — LOW (ref 13–17)
MCHC RBC-ENTMCNC: 30.7 PG — SIGNIFICANT CHANGE UP (ref 27–34)
MCHC RBC-ENTMCNC: 32.9 GM/DL — SIGNIFICANT CHANGE UP (ref 32–36)
MCV RBC AUTO: 93.2 FL — SIGNIFICANT CHANGE UP (ref 80–100)
NRBC # BLD: 0 /100 WBCS — SIGNIFICANT CHANGE UP (ref 0–0)
PLATELET # BLD AUTO: 378 K/UL — SIGNIFICANT CHANGE UP (ref 150–400)
POTASSIUM SERPL-MCNC: 3.9 MMOL/L — SIGNIFICANT CHANGE UP (ref 3.5–5.3)
POTASSIUM SERPL-SCNC: 3.9 MMOL/L — SIGNIFICANT CHANGE UP (ref 3.5–5.3)
RBC # BLD: 3.55 M/UL — LOW (ref 4.2–5.8)
RBC # FLD: 14.5 % — SIGNIFICANT CHANGE UP (ref 10.3–14.5)
SODIUM SERPL-SCNC: 141 MMOL/L — SIGNIFICANT CHANGE UP (ref 135–145)
WBC # BLD: 12.51 K/UL — HIGH (ref 3.8–10.5)
WBC # FLD AUTO: 12.51 K/UL — HIGH (ref 3.8–10.5)

## 2018-12-10 PROCEDURE — 71045 X-RAY EXAM CHEST 1 VIEW: CPT | Mod: 26,76

## 2018-12-10 PROCEDURE — 99232 SBSQ HOSP IP/OBS MODERATE 35: CPT | Mod: 24

## 2018-12-10 RX ORDER — ATORVASTATIN CALCIUM 80 MG/1
1 TABLET, FILM COATED ORAL
Qty: 0 | Refills: 0 | DISCHARGE
Start: 2018-12-10

## 2018-12-10 RX ORDER — ROPINIROLE 8 MG/1
1 TABLET, FILM COATED, EXTENDED RELEASE ORAL
Qty: 0 | Refills: 0 | COMMUNITY

## 2018-12-10 RX ORDER — ROPINIROLE 8 MG/1
1 TABLET, FILM COATED, EXTENDED RELEASE ORAL
Qty: 0 | Refills: 0 | COMMUNITY
Start: 2018-12-10

## 2018-12-10 RX ORDER — ASPIRIN/CALCIUM CARB/MAGNESIUM 324 MG
1 TABLET ORAL
Qty: 0 | Refills: 0 | COMMUNITY

## 2018-12-10 RX ORDER — ROPINIROLE 8 MG/1
1 TABLET, FILM COATED, EXTENDED RELEASE ORAL
Qty: 0 | Refills: 0 | DISCHARGE
Start: 2018-12-10

## 2018-12-10 RX ADMIN — LOSARTAN POTASSIUM 25 MILLIGRAM(S): 100 TABLET, FILM COATED ORAL at 05:31

## 2018-12-10 RX ADMIN — HEPARIN SODIUM 5000 UNIT(S): 5000 INJECTION INTRAVENOUS; SUBCUTANEOUS at 13:14

## 2018-12-10 RX ADMIN — HEPARIN SODIUM 5000 UNIT(S): 5000 INJECTION INTRAVENOUS; SUBCUTANEOUS at 05:31

## 2018-12-10 RX ADMIN — DULOXETINE HYDROCHLORIDE 60 MILLIGRAM(S): 30 CAPSULE, DELAYED RELEASE ORAL at 13:14

## 2018-12-10 RX ADMIN — Medication 25 MILLIGRAM(S): at 05:31

## 2018-12-10 NOTE — DISCHARGE NOTE ADULT - MEDICATION SUMMARY - MEDICATIONS TO CHANGE
I will SWITCH the dose or number of times a day I take the medications listed below when I get home from the hospital:    Requip 2 mg oral tablet  -- 1 tab(s) by mouth 3 times a day, As Needed    Requip 4 mg oral tablet  -- 1 tab(s) by mouth 3 times a day, As Needed

## 2018-12-10 NOTE — PROGRESS NOTE ADULT - SUBJECTIVE AND OBJECTIVE BOX
Subjective:  Pain is controlled with PCA, CT to water seal.  No events.    Vital Signs:  Vital Signs Last 24 Hrs  T(C): 36.8 (12-10-18 @ 09:01), Max: 37.3 (12-09-18 @ 16:45)  T(F): 98.3 (12-10-18 @ 09:01), Max: 99.1 (12-09-18 @ 16:45)  HR: 62 (12-10-18 @ 10:00) (56 - 86)  BP: 146/67 (12-10-18 @ 10:00) (130/66 - 162/71)  RR: 11 (12-10-18 @ 10:00) (11 - 18)  SpO2: 97% (12-10-18 @ 10:00) (94% - 99%) on RA    Telemetry/Alarms: sinus    Relevant labs, radiology and Medications reviewed                        10.9   12.51 )-----------( 378      ( 10 Dec 2018 06:42 )             33.1     12-10    141  |  111<H>  |  17  ----------------------------<  120<H>  3.9   |  21<L>  |  1.23    Ca    8.4<L>      10 Dec 2018 06:42        MEDICATIONS  (STANDING):  atorvastatin 10 milliGRAM(s) Oral at bedtime  docusate sodium 100 milliGRAM(s) Oral three times a day  DULoxetine 60 milliGRAM(s) Oral daily  heparin  Injectable 5000 Unit(s) SubCutaneous every 8 hours  lidocaine   Patch 1 Patch Transdermal daily  losartan 25 milliGRAM(s) Oral daily  metoprolol tartrate 25 milliGRAM(s) Oral two times a day  senna 2 Tablet(s) Oral at bedtime    MEDICATIONS  (PRN):  melatonin 5 milliGRAM(s) Oral at bedtime PRN Sleep  morphine  - Injectable 2 milliGRAM(s) IV Push every 3 hours PRN Severe Pain (7 - 10)  oxyCODONE    5 mG/acetaminophen 325 mG 1 Tablet(s) Oral every 4 hours PRN Mild Pain (1 - 3)  oxyCODONE    5 mG/acetaminophen 325 mG 2 Tablet(s) Oral every 4 hours PRN Moderate Pain (4 - 6)  rOPINIRole 1 milliGRAM(s) Oral two times a day PRN restless leg syndrome  rOPINIRole 2 milliGRAM(s) Oral at bedtime PRN Restless leg syndrome      Physical exam  Gen: NAD, breathing comfortably.  Neuro:  AO X 3.  Card: S1 S2 RRR.  Pulm: CTA, mild crepitus.  Abd: NT ND.  Ext: no edema.    Tubes: right CT to water seal, scant serous drainage, no air leak; d/c'd at bedside.  Tolerated it well.    I&O's Summary    09 Dec 2018 07:01  -  10 Dec 2018 07:00  --------------------------------------------------------  IN: 100 mL / OUT: 1430 mL / NET: -1330 mL    Assessment  73y Male  w/ PAST MEDICAL & SURGICAL HISTORY:  Umbilical hernia  Osteoarthritis  Cervical radiculopathy  Shoulder pain  Low back pain  HLD (hyperlipidemia)  HTN (hypertension)  Lung nodule: right lung  RLS (restless legs syndrome)  H/O colonoscopy  admitted with Lung nodule (08 Dec 2018 09:20)  .  12/7/18 patient underwent Robotic assistance in thoracoscopic procedure right upper lobe wedge.      PLAN  Post CT removal CXR.  D/C PCA.  PO pain control.  D/C planning for later today.

## 2018-12-10 NOTE — PROGRESS NOTE ADULT - SUBJECTIVE AND OBJECTIVE BOX
Pt is c/o mild Rt chest wall pain from CT, no SOB, uneventful night.    Date of Service: 12-10-18 @ 08:49    Vital Signs Last 24 Hrs  T(C): 37.1 (09 Dec 2018 22:00), Max: 37.3 (09 Dec 2018 16:45)  T(F): 98.7 (09 Dec 2018 22:00), Max: 99.1 (09 Dec 2018 16:45)  HR: 71 (10 Dec 2018 06:00) (66 - 86)  BP: 152/74 (10 Dec 2018 06:00) (130/66 - 162/71)  BP(mean): 93 (10 Dec 2018 06:00) (81 - 105)  RR: 14 (10 Dec 2018 06:00) (13 - 18)  SpO2: 98% (10 Dec 2018 06:00) (94% - 100%)    Daily     Daily     I&O's Detail    09 Dec 2018 07:01  -  10 Dec 2018 07:00  --------------------------------------------------------  IN:    IV PiggyBack: 100 mL  Total IN: 100 mL    OUT:    Chest Tube: 30 mL    Voided: 1400 mL  Total OUT: 1430 mL    Total NET: -1330 mL          CAPILLARY BLOOD GLUCOSE              CARDIAC MARKERS ( 08 Dec 2018 23:33 )  <0.015 ng/mL / x     / x     / x     / x      CARDIAC MARKERS ( 08 Dec 2018 19:57 )  <0.015 ng/mL / x     / x     / x     / x      CARDIAC MARKERS ( 08 Dec 2018 10:33 )  <0.015 ng/mL / x     / 270 U/L / x     / x                                  10.9   12.51 )-----------( 378      ( 10 Dec 2018 06:42 )             33.1       12-10    141  |  111<H>  |  17  ----------------------------<  120<H>  3.9   |  21<L>  |  1.23    Ca    8.4<L>      10 Dec 2018 06:42                        MEDICATIONS  (STANDING):  atorvastatin 10 milliGRAM(s) Oral at bedtime  docusate sodium 100 milliGRAM(s) Oral three times a day  DULoxetine 60 milliGRAM(s) Oral daily  heparin  Injectable 5000 Unit(s) SubCutaneous every 8 hours  HYDROmorphone PCA (1 mG/mL) 30 milliLiter(s) PCA Continuous PCA Continuous  lidocaine   Patch 1 Patch Transdermal daily  losartan 25 milliGRAM(s) Oral daily  metoprolol tartrate 25 milliGRAM(s) Oral two times a day  senna 2 Tablet(s) Oral at bedtime    MEDICATIONS  (PRN):  HYDROmorphone PCA (1 mG/mL) Rescue Clinician Bolus 0.5 milliGRAM(s) IV Push every 15 minutes PRN for Pain Scale GREATER THAN 6  melatonin 5 milliGRAM(s) Oral at bedtime PRN Sleep  morphine  - Injectable 2 milliGRAM(s) IV Push every 3 hours PRN Severe Pain (7 - 10)  naloxone Injectable 0.1 milliGRAM(s) IV Push every 3 minutes PRN For ANY of the following changes in patient status:  A. RR LESS THAN 10 breaths per minute, B. Oxygen saturation LESS THAN 90%, C. Sedation score of 6  ondansetron Injectable 4 milliGRAM(s) IV Push every 6 hours PRN Nausea  oxyCODONE    5 mG/acetaminophen 325 mG 1 Tablet(s) Oral every 4 hours PRN Mild Pain (1 - 3)  oxyCODONE    5 mG/acetaminophen 325 mG 2 Tablet(s) Oral every 4 hours PRN Moderate Pain (4 - 6)  rOPINIRole 1 milliGRAM(s) Oral two times a day PRN restless leg syndrome  rOPINIRole 2 milliGRAM(s) Oral at bedtime PRN Restless leg syndrome

## 2018-12-10 NOTE — PROGRESS NOTE ADULT - SUBJECTIVE AND OBJECTIVE BOX
Subjective:    pat lying in bed, no respiratory distress.    Home Medications:  aspirin 81 mg oral tablet: 1 tab(s) orally once a day (07 Dec 2018 10:39)  aspirin 81 mg oral tablet: 1 tab(s) orally once a day (07 Dec 2018 10:39)  Bystolic 10 mg oral tablet: 1 tab(s) orally once a day (07 Dec 2018 10:39)  Fish Oil oral capsule:  (07 Dec 2018 10:39)  HYDROcodone-acetaminophen 10 mg-325 mg oral tablet: 1 tab(s) orally HS (07 Dec 2018 10:39)  losartan 25 mg oral tablet: 1 tab(s) orally once a day (07 Dec 2018 10:39)  Multiple Vitamins oral tablet: 1 tab(s) orally once a day (07 Dec 2018 10:39)  Narcof: 10 milligram(s) orally , As Needed (07 Dec 2018 10:39)  Requip 2 mg oral tablet: 1 tab(s) orally 3 times a day, As Needed (07 Dec 2018 10:39)  Requip 4 mg oral tablet: 1 tab(s) orally 3 times a day, As Needed (07 Dec 2018 10:39)    MEDICATIONS  (STANDING):  atorvastatin 10 milliGRAM(s) Oral at bedtime  docusate sodium 100 milliGRAM(s) Oral three times a day  DULoxetine 60 milliGRAM(s) Oral daily  heparin  Injectable 5000 Unit(s) SubCutaneous every 8 hours  HYDROmorphone PCA (1 mG/mL) 30 milliLiter(s) PCA Continuous PCA Continuous  lidocaine   Patch 1 Patch Transdermal daily  losartan 25 milliGRAM(s) Oral daily  metoprolol tartrate 25 milliGRAM(s) Oral two times a day  senna 2 Tablet(s) Oral at bedtime    MEDICATIONS  (PRN):  HYDROmorphone PCA (1 mG/mL) Rescue Clinician Bolus 0.5 milliGRAM(s) IV Push every 15 minutes PRN for Pain Scale GREATER THAN 6  melatonin 5 milliGRAM(s) Oral at bedtime PRN Sleep  morphine  - Injectable 2 milliGRAM(s) IV Push every 3 hours PRN Severe Pain (7 - 10)  naloxone Injectable 0.1 milliGRAM(s) IV Push every 3 minutes PRN For ANY of the following changes in patient status:  A. RR LESS THAN 10 breaths per minute, B. Oxygen saturation LESS THAN 90%, C. Sedation score of 6  ondansetron Injectable 4 milliGRAM(s) IV Push every 6 hours PRN Nausea  oxyCODONE    5 mG/acetaminophen 325 mG 1 Tablet(s) Oral every 4 hours PRN Mild Pain (1 - 3)  oxyCODONE    5 mG/acetaminophen 325 mG 2 Tablet(s) Oral every 4 hours PRN Moderate Pain (4 - 6)  rOPINIRole 1 milliGRAM(s) Oral two times a day PRN restless leg syndrome  rOPINIRole 2 milliGRAM(s) Oral at bedtime PRN Restless leg syndrome      Allergies    No Known Allergies    Intolerances        Vital Signs Last 24 Hrs  T(C): 36.8 (10 Dec 2018 09:01), Max: 37.3 (09 Dec 2018 16:45)  T(F): 98.3 (10 Dec 2018 09:01), Max: 99.1 (09 Dec 2018 16:45)  HR: 71 (10 Dec 2018 06:00) (66 - 86)  BP: 152/74 (10 Dec 2018 06:00) (130/66 - 162/71)  BP(mean): 93 (10 Dec 2018 06:00) (81 - 105)  RR: 14 (10 Dec 2018 06:00) (13 - 18)  SpO2: 98% (10 Dec 2018 06:00) (94% - 99%)      PHYSICAL EXAMINATION:    NECK:  Supple. No lymphadenopathy. Jugular venous pressure not elevated. Carotids equal.   HEART:   The cardiac impulse has a normal quality. Reg., Nl S1 and S2.  There are no murmurs, rubs or gallops noted  CHEST:  Chest is clear to auscultation. Normal respiratory effort.  ABDOMEN:  Soft and nontender.   EXTREMITIES:  There is no edema.       LABS:                        10.9   12.51 )-----------( 378      ( 10 Dec 2018 06:42 )             33.1     12-10    141  |  111<H>  |  17  ----------------------------<  120<H>  3.9   |  21<L>  |  1.23    Ca    8.4<L>      10 Dec 2018 06:42

## 2018-12-10 NOTE — DISCHARGE NOTE ADULT - FINDINGS/TREATMENT
Flexible bronchoscopy right upper lobe wedge resection, mediastinal lymph node dissection  Please follow up with Dr. Botello for the final pathology report.

## 2018-12-10 NOTE — DISCHARGE NOTE ADULT - PATIENT PORTAL LINK FT
You can access the [facility] Portal, offered by [facility], by registering with the following website: [email or web address]

## 2018-12-10 NOTE — DISCHARGE NOTE ADULT - MEDICATION SUMMARY - MEDICATIONS TO TAKE
I will START or STAY ON the medications listed below when I get home from the hospital:    aspirin 81 mg oral tablet  -- 1 tab(s) by mouth once a day  -- Indication: For Cardiovascular Health    oxyCODONE-acetaminophen 5 mg-325 mg oral tablet  -- 1 tab(s) by mouth every 4 hours, As needed, Mild Pain (1 - 3)  -- Indication: For Pain    oxyCODONE-acetaminophen 5 mg-325 mg oral tablet  -- 1-2 tab(s) by mouth every 4-6 hours, As needed, Moderate Pain (4 - 6) MDD:10  -- Indication: For Pain    losartan 25 mg oral tablet  -- 1 tab(s) by mouth once a day  -- Indication: For HTN    atorvastatin 10 mg oral tablet  -- 1 tab(s) by mouth once a day (at bedtime)  -- Indication: For HLD    rOPINIRole 1 mg oral tablet  -- 1 tab(s) by mouth 2 times a day, As needed, restless leg syndrome  -- Indication: For RLS    rOPINIRole 2 mg oral tablet  -- 1 tab(s) by mouth once a day (at bedtime), As needed, Restless leg syndrome  -- Indication: For RLS    Bystolic 10 mg oral tablet  -- 1 tab(s) by mouth once a day  -- Indication: For HTN    Fish Oil oral capsule  -- Indication: For Dietary supplement    Multiple Vitamins oral tablet  -- 1 tab(s) by mouth once a day  -- Indication: For Dietary supplement

## 2018-12-10 NOTE — DISCHARGE NOTE ADULT - MEDICATION SUMMARY - MEDICATIONS TO STOP TAKING
I will STOP taking the medications listed below when I get home from the hospital:    HYDROcodone-acetaminophen 10 mg-325 mg oral tablet  -- 1 tab(s) by mouth HS    Narcof  -- 10 milligram(s) by mouth , As Needed

## 2018-12-10 NOTE — DISCHARGE NOTE ADULT - PLAN OF CARE
Completely recover from surgery Please follow up with your primary care provider regarding this hospital admission.

## 2018-12-10 NOTE — DISCHARGE NOTE ADULT - ADDITIONAL INSTRUCTIONS
Call Dr. Botello's Office at [phone] to schedule an appointment 7-10 days from discharge.    Sponge bathe only for 48 hours, then remove all dressings and shower normally with soap and water.  Keep chest tube site covered with gauze and tape until you see Dr. Botello for suture removal.

## 2018-12-10 NOTE — PROGRESS NOTE ADULT - ASSESSMENT
PROBLEMS:    RUL Lung Nodule-s/p wedge resection  Hyperlipidemia  HTN  Restless Leg Syndrome  Chronic Pain  Stage 3 with proteinuria    PLAN:    elevated WBc reactive  pain control  incentive spirometry  supportive care  CT surgery fu  DVT prophylasix
PROBLEMS:    RUL Lung Nodule-s/p wedge resection  Hyperlipidemia  HTN  Restless Leg Syndrome  Chronic Pain  Stage 3 with proteinuria    PLAN:    pulmonary better  WBc trending down  pain control  incentive spirometry  supportive care  CT surgery fu  DVT prophylasix
Pt is a 72 y/o male with h/o chronic Pain, rheumatoid arthritis, HTN, restless leg syndrome, pre diabetes, stage 3 CKD, hyperlipidemia, active smoker who was recently found to have RUL lung nodule.  He was evaluated by Dr Botello, pulmonary and admitted for elective VATs with RUL wedge resection.   Postop medicine consult called for comanagement by Dr Botello.      * RUL Lung Nodule-s/p wedge resection, await official path, doing well, continue pain control, CT management per Dr Botello, encourage use of incentive spirometry, monitor postop labs.    * Chest Pain-likely due to above and not cardiac.  CE negative, supportive care.     * Leukocytosis-reactive from surgery, monitor for now since he is asymptomatic     * Acute Blood Loss Anemia-surgical loss, monitor for now    * Hyperlipidemia- Lipitor      *HTN-losartan, add lopressor since bp high     * Restless Leg Syndrome-better controlled on requip 1/2 tablet of 2 mg twice a day as needed and 1 tablet at night as needed, not to exceed 4 mg per day.    * Chronic Pain-hold norco while on pca, continue cymbalta 60 mg     * Stage 3 with proteinuria-monitor renal fx, continue losartan     * Proph- heparin    * Disp-OOB, ambulate, d/c planning for possibly tomorrow     * Comm-d/w pt, RN
Pt is a 72 y/o male with h/o chronic Pain, rheumatoid arthritis, HTN, restless leg syndrome, pre diabetes, stage 3 CKD, hyperlipidemia, active smoker who was recently found to have RUL lung nodule.  He was evaluated by Dr Botello, pulmonary and admitted for elective VATs with RUL wedge resection.   Postop medicine consult called for comanagement by Dr Botello.      * RUL Lung Nodule-s/p wedge resection, await official path, doing well, continue pain control, CT management per Dr Botello, encourage use of incentive spirometry, monitor postop labs.    * Leukocytosis-reactive from surgery, monitor for now.     * Acute Blood Loss Anemia-surgical loss, monitor for now    * Hyperlipidemia- Lipitor      *HTN-losartan     * Restless Leg Syndrome-better controlled on requip 1/2 tablet of 2 mg twice a day as needed and 1 tablet at night as needed, not to exceed 4 mg per day.    * Chronic Pain-hold norco while on pca, continue cymbalta 60 mg     * Stage 3 with proteinuria-monitor renal fx, continue losartan     * Proph- heparin    * Disp-OOB, PT when able    * Comm-d/w pt, RN, Ivon
Pt is a 74 y/o male with h/o chronic Pain, rheumatoid arthritis, HTN, restless leg syndrome, pre diabetes, stage 3 CKD, hyperlipidemia, active smoker who was recently found to have RUL lung nodule.  He was evaluated by Dr Botello, pulmonary and admitted for elective VATs with RUL wedge resection.   Postop medicine consult called for comanagement by Dr Botello.      * RUL Lung Nodule-s/p wedge resection, await official path, doing well, continue pain control, CT management per Dr Botello, encourage use of incentive spirometry, monitor postop labs.    * Chest Pain-likely due to above and not cardiac, resolved.  CE negative, supportive care.     * Leukocytosis-reactive from surgery, monitor for now since he is asymptomatic and its improving    * Acute Blood Loss Anemia-surgical loss, monitor for now    * Hyperlipidemia- Lipitor      *HTN-losartan, lopressor      * Restless Leg Syndrome-better controlled on requip 1/2 tablet of 2 mg twice a day as needed and 1 tablet at night as needed, not to exceed 4 mg per day.    * Chronic Pain-hold norco while on pca, continue cymbalta 60 mg     * Stage 3 with proteinuria-monitor renal fx, continue losartan     * Proph- heparin    * Disp-OOB, ambulate, d/c planning per CT sx after CT removed     * Comm-d/w pt, RN

## 2018-12-10 NOTE — DISCHARGE NOTE ADULT - HOSPITAL COURSE
Vital Signs:  Vital Signs Last 24 Hrs  T(C): 36.8 (12-10-18 @ 09:01), Max: 37.3 (12-09-18 @ 16:45)  T(F): 98.3 (12-10-18 @ 09:01), Max: 99.1 (12-09-18 @ 16:45)  HR: 62 (12-10-18 @ 10:00) (56 - 86)  BP: 146/67 (12-10-18 @ 10:00) (130/66 - 162/71)  RR: 11 (12-10-18 @ 10:00) (11 - 18)  SpO2: 97% (12-10-18 @ 10:00) (94% - 99%) on RA    Telemetry/Alarms: sinus    Relevant labs, radiology and Medications reviewed                        10.9   12.51 )-----------( 378      ( 10 Dec 2018 06:42 )             33.1     12-10    141  |  111<H>  |  17  ----------------------------<  120<H>  3.9   |  21<L>  |  1.23    Ca    8.4<L>      10 Dec 2018 06:42        MEDICATIONS  (STANDING):  atorvastatin 10 milliGRAM(s) Oral at bedtime  docusate sodium 100 milliGRAM(s) Oral three times a day  DULoxetine 60 milliGRAM(s) Oral daily  heparin  Injectable 5000 Unit(s) SubCutaneous every 8 hours  lidocaine   Patch 1 Patch Transdermal daily  losartan 25 milliGRAM(s) Oral daily  metoprolol tartrate 25 milliGRAM(s) Oral two times a day  senna 2 Tablet(s) Oral at bedtime    MEDICATIONS  (PRN):  melatonin 5 milliGRAM(s) Oral at bedtime PRN Sleep  morphine  - Injectable 2 milliGRAM(s) IV Push every 3 hours PRN Severe Pain (7 - 10)  oxyCODONE    5 mG/acetaminophen 325 mG 1 Tablet(s) Oral every 4 hours PRN Mild Pain (1 - 3)  oxyCODONE    5 mG/acetaminophen 325 mG 2 Tablet(s) Oral every 4 hours PRN Moderate Pain (4 - 6)  rOPINIRole 1 milliGRAM(s) Oral two times a day PRN restless leg syndrome  rOPINIRole 2 milliGRAM(s) Oral at bedtime PRN Restless leg syndrome      Physical exam  Gen: NAD, breathing comfortably.  Neuro:  AO X 3.  Card: S1 S2 RRR.  Pulm: CTA, mild crepitus.  Abd: NT ND.  Ext: no edema.    Tubes: right CT to water seal, scant serous drainage, no air leak; d/c'd at bedside.  Tolerated it well.    I&O's Summary    09 Dec 2018 07:01  -  10 Dec 2018 07:00  --------------------------------------------------------  IN: 100 mL / OUT: 1430 mL / NET: -1330 mL    Assessment  73y Male  w/ PAST MEDICAL & SURGICAL HISTORY:  Umbilical hernia  Osteoarthritis  Cervical radiculopathy  Shoulder pain  Low back pain  HLD (hyperlipidemia)  HTN (hypertension)  Lung nodule: right lung  RLS (restless legs syndrome)  H/O colonoscopy  admitted with Lung nodule (08 Dec 2018 09:20)  .  12/7/18 patient underwent Robotic assistance in thoracoscopic procedure right upper lobe wedge.

## 2018-12-10 NOTE — DISCHARGE NOTE ADULT - CARE PLAN
Principal Discharge DX:	Lung nodule  Goal:	Completely recover from surgery  Assessment and plan of treatment:	Please follow up with your primary care provider regarding this hospital admission.

## 2018-12-10 NOTE — DISCHARGE NOTE ADULT - NS AS ACTIVITY OBS
No Heavy lifting/straining/Walking-Outdoors allowed/Walking-Indoors allowed/Do not make important decisions/Stairs allowed/Do not drive or operate machinery

## 2018-12-10 NOTE — DISCHARGE NOTE ADULT - CARE PROVIDER_API CALL
Florencio Botello), Thoracic Surgery  [address] [place] [address]  Phone: [phone]  Fax: [phone]

## 2018-12-11 PROBLEM — G25.81 RESTLESS LEGS SYNDROME: Chronic | Status: ACTIVE | Noted: 2018-11-30

## 2018-12-11 PROBLEM — I10 ESSENTIAL (PRIMARY) HYPERTENSION: Chronic | Status: ACTIVE | Noted: 2018-11-30

## 2018-12-11 PROBLEM — M54.12 RADICULOPATHY, CERVICAL REGION: Chronic | Status: ACTIVE | Noted: 2018-11-30

## 2018-12-11 PROBLEM — R91.1 SOLITARY PULMONARY NODULE: Chronic | Status: ACTIVE | Noted: 2018-11-30

## 2018-12-11 PROBLEM — E78.5 HYPERLIPIDEMIA, UNSPECIFIED: Chronic | Status: ACTIVE | Noted: 2018-11-30

## 2018-12-11 PROBLEM — M19.90 UNSPECIFIED OSTEOARTHRITIS, UNSPECIFIED SITE: Chronic | Status: ACTIVE | Noted: 2018-11-30

## 2018-12-11 PROBLEM — M25.519 PAIN IN UNSPECIFIED SHOULDER: Chronic | Status: ACTIVE | Noted: 2018-11-30

## 2018-12-11 PROBLEM — M54.5 LOW BACK PAIN: Chronic | Status: ACTIVE | Noted: 2018-11-30

## 2018-12-13 LAB — SURGICAL PATHOLOGY FINAL REPORT - CH: SIGNIFICANT CHANGE UP

## 2018-12-15 LAB
CULTURE RESULTS: SIGNIFICANT CHANGE UP
SPECIMEN SOURCE: SIGNIFICANT CHANGE UP

## 2018-12-17 ENCOUNTER — APPOINTMENT (OUTPATIENT)
Dept: THORACIC SURGERY | Facility: CLINIC | Age: 73
End: 2018-12-17
Payer: MEDICARE

## 2018-12-17 VITALS
SYSTOLIC BLOOD PRESSURE: 130 MMHG | WEIGHT: 161 LBS | HEIGHT: 66 IN | HEART RATE: 78 BPM | DIASTOLIC BLOOD PRESSURE: 68 MMHG | BODY MASS INDEX: 25.88 KG/M2 | RESPIRATION RATE: 18 BRPM | OXYGEN SATURATION: 99 %

## 2018-12-17 PROCEDURE — 99024 POSTOP FOLLOW-UP VISIT: CPT

## 2019-01-02 ENCOUNTER — RESULT REVIEW (OUTPATIENT)
Age: 74
End: 2019-01-02

## 2019-01-02 ENCOUNTER — OUTPATIENT (OUTPATIENT)
Dept: OUTPATIENT SERVICES | Facility: HOSPITAL | Age: 74
LOS: 1 days | Discharge: HOME | End: 2019-01-02
Payer: MEDICARE

## 2019-01-02 VITALS
HEART RATE: 81 BPM | HEIGHT: 66 IN | SYSTOLIC BLOOD PRESSURE: 160 MMHG | TEMPERATURE: 98 F | RESPIRATION RATE: 15 BRPM | OXYGEN SATURATION: 99 % | DIASTOLIC BLOOD PRESSURE: 84 MMHG | WEIGHT: 164.91 LBS

## 2019-01-02 DIAGNOSIS — Z98.890 OTHER SPECIFIED POSTPROCEDURAL STATES: Chronic | ICD-10-CM

## 2019-01-02 PROCEDURE — 88312 SPECIAL STAINS GROUP 1: CPT | Mod: 26

## 2019-01-02 PROCEDURE — 88305 TISSUE EXAM BY PATHOLOGIST: CPT | Mod: 26

## 2019-01-02 NOTE — ASU PATIENT PROFILE, ADULT - PMH
Cervical radiculopathy    HLD (hyperlipidemia)    HTN (hypertension)    Low back pain    Lung nodule  right lung  Osteoarthritis    RLS (restless legs syndrome)    Shoulder pain    Umbilical hernia

## 2019-01-03 LAB — SURGICAL PATHOLOGY FINAL REPORT - CH: SIGNIFICANT CHANGE UP

## 2019-01-05 LAB
CULTURE RESULTS: SIGNIFICANT CHANGE UP
SPECIMEN SOURCE: SIGNIFICANT CHANGE UP

## 2019-01-07 DIAGNOSIS — M06.9 RHEUMATOID ARTHRITIS, UNSPECIFIED: ICD-10-CM

## 2019-01-07 DIAGNOSIS — Z87.891 PERSONAL HISTORY OF NICOTINE DEPENDENCE: ICD-10-CM

## 2019-01-07 DIAGNOSIS — K31.89 OTHER DISEASES OF STOMACH AND DUODENUM: ICD-10-CM

## 2019-01-07 DIAGNOSIS — Z87.442 PERSONAL HISTORY OF URINARY CALCULI: ICD-10-CM

## 2019-01-07 DIAGNOSIS — Z79.82 LONG TERM (CURRENT) USE OF ASPIRIN: ICD-10-CM

## 2019-01-07 DIAGNOSIS — R13.10 DYSPHAGIA, UNSPECIFIED: ICD-10-CM

## 2019-01-07 DIAGNOSIS — N18.9 CHRONIC KIDNEY DISEASE, UNSPECIFIED: ICD-10-CM

## 2019-01-07 DIAGNOSIS — K31.1 ADULT HYPERTROPHIC PYLORIC STENOSIS: ICD-10-CM

## 2019-01-07 DIAGNOSIS — I12.9 HYPERTENSIVE CHRONIC KIDNEY DISEASE WITH STAGE 1 THROUGH STAGE 4 CHRONIC KIDNEY DISEASE, OR UNSPECIFIED CHRONIC KIDNEY DISEASE: ICD-10-CM

## 2019-01-07 DIAGNOSIS — E78.5 HYPERLIPIDEMIA, UNSPECIFIED: ICD-10-CM

## 2019-01-07 DIAGNOSIS — Z85.118 PERSONAL HISTORY OF OTHER MALIGNANT NEOPLASM OF BRONCHUS AND LUNG: ICD-10-CM

## 2019-01-07 DIAGNOSIS — R10.11 RIGHT UPPER QUADRANT PAIN: ICD-10-CM

## 2019-01-23 ENCOUNTER — OUTPATIENT (OUTPATIENT)
Dept: OUTPATIENT SERVICES | Facility: HOSPITAL | Age: 74
LOS: 1 days | Discharge: HOME | End: 2019-01-23
Payer: MEDICARE

## 2019-01-23 ENCOUNTER — RESULT REVIEW (OUTPATIENT)
Age: 74
End: 2019-01-23

## 2019-01-23 VITALS
TEMPERATURE: 99 F | WEIGHT: 164.91 LBS | OXYGEN SATURATION: 99 % | DIASTOLIC BLOOD PRESSURE: 94 MMHG | HEART RATE: 94 BPM | HEIGHT: 66 IN | SYSTOLIC BLOOD PRESSURE: 165 MMHG | RESPIRATION RATE: 15 BRPM

## 2019-01-23 DIAGNOSIS — Z98.890 OTHER SPECIFIED POSTPROCEDURAL STATES: Chronic | ICD-10-CM

## 2019-01-23 DIAGNOSIS — Z90.2 ACQUIRED ABSENCE OF LUNG [PART OF]: Chronic | ICD-10-CM

## 2019-01-23 PROCEDURE — 88312 SPECIAL STAINS GROUP 1: CPT | Mod: 26

## 2019-01-23 PROCEDURE — 88305 TISSUE EXAM BY PATHOLOGIST: CPT | Mod: 26

## 2019-01-23 NOTE — ASU PATIENT PROFILE, ADULT - PMH
Cervical radiculopathy    HLD (hyperlipidemia)    HTN (hypertension)    Low back pain    Lung nodule  right lung  Osteoarthritis    RLS (restless legs syndrome)    Shoulder pain

## 2019-01-23 NOTE — ASU PREOP CHECKLIST - ANTIBIOTIC
----- Message from Pam Mei 
Spoke to pt's daughter and informed of dexa results, she stated that she would speak to pt and call back regarding fosamax.   
n/a

## 2019-01-24 LAB — SURGICAL PATHOLOGY FINAL REPORT - CH: SIGNIFICANT CHANGE UP

## 2019-01-26 LAB
CULTURE RESULTS: SIGNIFICANT CHANGE UP
SPECIMEN SOURCE: SIGNIFICANT CHANGE UP

## 2019-01-29 DIAGNOSIS — I12.9 HYPERTENSIVE CHRONIC KIDNEY DISEASE WITH STAGE 1 THROUGH STAGE 4 CHRONIC KIDNEY DISEASE, OR UNSPECIFIED CHRONIC KIDNEY DISEASE: ICD-10-CM

## 2019-01-29 DIAGNOSIS — R68.81 EARLY SATIETY: ICD-10-CM

## 2019-01-29 DIAGNOSIS — E78.5 HYPERLIPIDEMIA, UNSPECIFIED: ICD-10-CM

## 2019-01-29 DIAGNOSIS — N20.0 CALCULUS OF KIDNEY: ICD-10-CM

## 2019-01-29 DIAGNOSIS — K31.89 OTHER DISEASES OF STOMACH AND DUODENUM: ICD-10-CM

## 2019-01-29 DIAGNOSIS — Z85.118 PERSONAL HISTORY OF OTHER MALIGNANT NEOPLASM OF BRONCHUS AND LUNG: ICD-10-CM

## 2019-01-29 DIAGNOSIS — M06.9 RHEUMATOID ARTHRITIS, UNSPECIFIED: ICD-10-CM

## 2019-01-29 DIAGNOSIS — R10.10 UPPER ABDOMINAL PAIN, UNSPECIFIED: ICD-10-CM

## 2019-01-29 DIAGNOSIS — K31.1 ADULT HYPERTROPHIC PYLORIC STENOSIS: ICD-10-CM

## 2019-01-29 DIAGNOSIS — Z87.891 PERSONAL HISTORY OF NICOTINE DEPENDENCE: ICD-10-CM

## 2019-01-29 DIAGNOSIS — N18.9 CHRONIC KIDNEY DISEASE, UNSPECIFIED: ICD-10-CM

## 2019-01-29 DIAGNOSIS — Z79.891 LONG TERM (CURRENT) USE OF OPIATE ANALGESIC: ICD-10-CM

## 2019-01-29 DIAGNOSIS — Z79.82 LONG TERM (CURRENT) USE OF ASPIRIN: ICD-10-CM

## 2019-02-06 ENCOUNTER — RESULT REVIEW (OUTPATIENT)
Age: 74
End: 2019-02-06

## 2019-02-06 ENCOUNTER — OUTPATIENT (OUTPATIENT)
Dept: OUTPATIENT SERVICES | Facility: HOSPITAL | Age: 74
LOS: 1 days | Discharge: HOME | End: 2019-02-06
Payer: MEDICARE

## 2019-02-06 VITALS
HEIGHT: 66 IN | DIASTOLIC BLOOD PRESSURE: 106 MMHG | OXYGEN SATURATION: 100 % | RESPIRATION RATE: 16 BRPM | HEART RATE: 96 BPM | SYSTOLIC BLOOD PRESSURE: 168 MMHG | TEMPERATURE: 98 F | WEIGHT: 151.9 LBS

## 2019-02-06 DIAGNOSIS — Z98.890 OTHER SPECIFIED POSTPROCEDURAL STATES: Chronic | ICD-10-CM

## 2019-02-06 DIAGNOSIS — Z90.2 ACQUIRED ABSENCE OF LUNG [PART OF]: Chronic | ICD-10-CM

## 2019-02-06 PROCEDURE — 88312 SPECIAL STAINS GROUP 1: CPT | Mod: 26

## 2019-02-06 PROCEDURE — 88305 TISSUE EXAM BY PATHOLOGIST: CPT | Mod: 26

## 2019-02-06 RX ORDER — NEBIVOLOL HYDROCHLORIDE 5 MG/1
1 TABLET ORAL
Qty: 0 | Refills: 0 | COMMUNITY

## 2019-02-08 LAB — SURGICAL PATHOLOGY FINAL REPORT - CH: SIGNIFICANT CHANGE UP

## 2019-02-13 DIAGNOSIS — E78.5 HYPERLIPIDEMIA, UNSPECIFIED: ICD-10-CM

## 2019-02-13 DIAGNOSIS — K25.9 GASTRIC ULCER, UNSPECIFIED AS ACUTE OR CHRONIC, WITHOUT HEMORRHAGE OR PERFORATION: ICD-10-CM

## 2019-02-13 DIAGNOSIS — I12.9 HYPERTENSIVE CHRONIC KIDNEY DISEASE WITH STAGE 1 THROUGH STAGE 4 CHRONIC KIDNEY DISEASE, OR UNSPECIFIED CHRONIC KIDNEY DISEASE: ICD-10-CM

## 2019-02-13 DIAGNOSIS — N18.9 CHRONIC KIDNEY DISEASE, UNSPECIFIED: ICD-10-CM

## 2019-02-13 DIAGNOSIS — Z79.82 LONG TERM (CURRENT) USE OF ASPIRIN: ICD-10-CM

## 2019-02-13 DIAGNOSIS — M06.9 RHEUMATOID ARTHRITIS, UNSPECIFIED: ICD-10-CM

## 2019-02-13 DIAGNOSIS — Z90.2 ACQUIRED ABSENCE OF LUNG [PART OF]: ICD-10-CM

## 2019-02-13 DIAGNOSIS — R63.4 ABNORMAL WEIGHT LOSS: ICD-10-CM

## 2019-02-13 DIAGNOSIS — K31.1 ADULT HYPERTROPHIC PYLORIC STENOSIS: ICD-10-CM

## 2019-02-13 DIAGNOSIS — K29.50 UNSPECIFIED CHRONIC GASTRITIS WITHOUT BLEEDING: ICD-10-CM

## 2019-02-13 DIAGNOSIS — Z87.891 PERSONAL HISTORY OF NICOTINE DEPENDENCE: ICD-10-CM

## 2019-02-13 DIAGNOSIS — R68.81 EARLY SATIETY: ICD-10-CM

## 2019-02-13 DIAGNOSIS — Z85.118 PERSONAL HISTORY OF OTHER MALIGNANT NEOPLASM OF BRONCHUS AND LUNG: ICD-10-CM

## 2019-03-04 ENCOUNTER — APPOINTMENT (OUTPATIENT)
Dept: THORACIC SURGERY | Facility: CLINIC | Age: 74
End: 2019-03-04

## 2019-04-15 ENCOUNTER — APPOINTMENT (OUTPATIENT)
Dept: THORACIC SURGERY | Facility: CLINIC | Age: 74
End: 2019-04-15
Payer: MEDICARE

## 2019-04-15 VITALS
TEMPERATURE: 98.5 F | HEART RATE: 88 BPM | RESPIRATION RATE: 15 BRPM | DIASTOLIC BLOOD PRESSURE: 99 MMHG | OXYGEN SATURATION: 98 % | SYSTOLIC BLOOD PRESSURE: 153 MMHG

## 2019-04-15 PROCEDURE — 99212 OFFICE O/P EST SF 10 MIN: CPT

## 2019-04-15 RX ORDER — DULOXETINE HYDROCHLORIDE 30 MG/1
CAPSULE, DELAYED RELEASE ORAL
Refills: 0 | Status: ACTIVE | COMMUNITY

## 2019-04-15 RX ORDER — ATORVASTATIN CALCIUM 10 MG/1
10 TABLET, FILM COATED ORAL
Refills: 0 | Status: ACTIVE | COMMUNITY

## 2019-04-15 RX ORDER — VARENICLINE TARTRATE 1 MG/1
1 TABLET, FILM COATED ORAL
Refills: 0 | Status: DISCONTINUED | COMMUNITY
Start: 2018-11-08 | End: 2019-04-15

## 2019-04-15 RX ORDER — MELATONIN/PYRIDOXINE HCL (B6) 10 MG-10MG
TABLET,IMMED, EXTENDED RELEASE, BIPHASIC ORAL
Refills: 0 | Status: ACTIVE | COMMUNITY

## 2019-04-15 NOTE — CONSULT LETTER
[Dear  ___] : Dear  [unfilled], [Courtesy Letter:] : I had the pleasure of seeing your patient, [unfilled], in my office today. [Please see my note below.] : Please see my note below. [Sincerely,] : Sincerely, [FreeTextEntry2] : Dr. Fiore (PCP / Ref)\par  [FreeTextEntry3] : \par \par Florencio Botello MD \par Department of Cardiovascular and Thoracic Surgery \par  \par [facility]\par \par

## 2019-04-15 NOTE — HISTORY OF PRESENT ILLNESS
[FreeTextEntry1] : \par Mr. Palencia is a 73 year old male S/P FB, Right Robotic Assisted Upper Lobe Wedge Resection, MLND on 12/7/2018. 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 The patient presents for 3 month follow up. \par \par His most recent CT chest scan dated 2/2719 notes:\par Post surgical changes in the right upper lobe with nonspecific lobular soft tissue density in the medial right upper lobe along the surgical material, measured around 12 mm. The rest of the lung parenchyma is stable.  There is persistent inflammatory bronchial disease with diffuse bronchial wall thickening and associated endobronchial mucous opacity, for example left lower lobe medial basal segmental bronchi.  There is prominent right upper mediastinal node, just medial to the right upper lobe surgical suture.  This node measures about 11mm, slightly more prominent compared to 9mm previously.  \par \par Patient denies any fever, chills, night sweats, dizziness, lightheadedness.  Since his last visit he denies any new health changes.  He does report, however, he has difficulty sleeping which he attributes to sleep apnea, however, he has not been worked up for same and will follow up with his primary care physician.  \par \par

## 2019-04-15 NOTE — PHYSICAL EXAM
[Sclera] : the sclera and conjunctiva were normal [Neck Appearance] : the appearance of the neck was normal [Exaggerated Use Of Accessory Muscles For Inspiration] : no accessory muscle use [Respiration, Rhythm And Depth] : normal respiratory rhythm and effort [Auscultation Breath Sounds / Voice Sounds] : lungs were clear to auscultation bilaterally [Apical Impulse] : the apical impulse was normal [Heart Sounds] : normal S1 and S2 [Heart Rate And Rhythm] : heart rate was normal and rhythm regular [Examination Of The Chest] : the chest was normal in appearance [Diminished Respiratory Excursion] : normal chest expansion [Chest Visual Inspection Thoracic Asymmetry] : no chest asymmetry [2+] : left 2+ [Abdomen Tenderness] : non-tender [Abdomen Soft] : soft [No CVA Tenderness] : no ~M costovertebral angle tenderness [Cervical Lymph Nodes Enlarged Posterior Bilaterally] : posterior cervical [Abnormal Walk] : normal gait [Involuntary Movements] : no involuntary movements were seen [Skin Color & Pigmentation] : normal skin color and pigmentation [] : no rash [No Focal Deficits] : no focal deficits [Impaired Insight] : insight and judgment were intact [Oriented To Time, Place, And Person] : oriented to person, place, and time [FreeTextEntry1] : deferred

## 2019-04-15 NOTE — REVIEW OF SYSTEMS
[Negative] : Heme/Lymph [FreeTextEntry1] : poor sleeping due to possible VIDAL.  Patient will follow up with PCP.

## 2019-04-15 NOTE — ASSESSMENT
[FreeTextEntry1] : \par Mr. Palencia is a 73 year old male S/P FB, Right Robotic Assisted Upper Lobe Wedge Resection, MLND on 12/7/2018. 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 The patient presents for 3 month follow up. \par \par His most recent CT chest scan dated 2/2719 notes:\par Post surgical changes in the right upper lobe with nonspecific lobular soft tissue density in the medial right upper lobe along the surgical material, measured around 12 mm. The rest of the lung parenchyma is stable.  There is persistent inflammatory bronchial disease with diffuse bronchial wall thickening and associated endobronchial mucous opacity, for example left lower lobe medial basal segmental bronchi.  There is prominent right upper mediastinal node, just medial to the right upper lobe surgical suture.  This node measures about 11mm, slightly more prominent compared to 9mm previously.  \par \par I have reviewed the patient's medical records and diagnostic images during the time of this office visit, and I have made the following recommendation:\par \par -  Chest CT in 3 months\par -  Follow up office visit in 3 months after Chest CT\par \par Written by Shannan Cox NP acting as a scribe for Florencio Metcalf MD.\par The documentation recorded by the scribe accurately reflects the service I personally performed and the decisions made by me. FLORENCIO METCALF MD\par \par \par \par

## 2019-07-11 ENCOUNTER — OTHER (OUTPATIENT)
Age: 74
End: 2019-07-11

## 2019-07-11 PROBLEM — C34.91 ADENOCARCINOMA OF LUNG, RIGHT: Status: ACTIVE | Noted: 2018-12-17

## 2019-07-11 PROBLEM — R91.1 LUNG NODULE: Status: ACTIVE | Noted: 2019-07-11

## 2019-07-15 ENCOUNTER — APPOINTMENT (OUTPATIENT)
Dept: THORACIC SURGERY | Facility: CLINIC | Age: 74
End: 2019-07-15
Payer: MEDICARE

## 2019-07-15 VITALS
HEART RATE: 80 BPM | DIASTOLIC BLOOD PRESSURE: 82 MMHG | WEIGHT: 160 LBS | RESPIRATION RATE: 16 BRPM | TEMPERATURE: 98.3 F | BODY MASS INDEX: 25.82 KG/M2 | OXYGEN SATURATION: 97 % | SYSTOLIC BLOOD PRESSURE: 164 MMHG

## 2019-07-15 DIAGNOSIS — R91.1 SOLITARY PULMONARY NODULE: ICD-10-CM

## 2019-07-15 DIAGNOSIS — C34.91 MALIGNANT NEOPLASM OF UNSPECIFIED PART OF RIGHT BRONCHUS OR LUNG: ICD-10-CM

## 2019-07-15 PROCEDURE — 99214 OFFICE O/P EST MOD 30 MIN: CPT

## 2019-07-17 NOTE — HISTORY OF PRESENT ILLNESS
[FreeTextEntry1] : Mr. Palencia is a 73 year old male S/P FB, Right Robotic Assisted Upper Lobe Wedge Resection, MLND on 12/7/2018. Pathology revealed: Infiltrating, moderately to poorly differentiated adenocarcinoma, pT1a, pN0.\par \par Chest CT on 7/11/19 revealed:\par - stable postsurgical change in the right lung apex, s/p wedge resection\par - stable 1.0 x 0.7 cm region of nodularity along the medial margin of the scar (6:19)\par - no new lesions have developed\par - the visualized osseous structures shoe healed fractures of the right 5th, 6th, 7th, 8th, 9th and 10th ribs\par \par CT chest scan dated 2/2719 notes:\par Post surgical changes in the right upper lobe with nonspecific lobular soft tissue density in the medial right upper lobe along the surgical material, measured around 12 mm. \par - There is persistent inflammatory bronchial disease with diffuse bronchial wall thickening and associated endobronchial mucous opacity, for example left lower lobe medial basal segmental bronchi. \par - There is prominent right upper mediastinal node, just medial to the right upper lobe surgical sutures, which measures about 11mm, slightly more prominent compared to 9mm previously. \par \par Pt presents today for follow up. Pt reports doing well, denies cough, SOB, or CP.\par

## 2019-07-17 NOTE — DATA REVIEWED
[FreeTextEntry1] : Chest CT on 7/11/19 revealed:\par - stable postsurgical change in the right lung apex, s/p wedge resection\par - stable 1.0 x 0.7 cm region of nodularity along the medial margin of the scar (6:19)\par - no new lesions have developed\par - the visualized osseous structures shoe healed fractures of the right 5th, 6th, 7th, 8th, 9th and 10th ribs

## 2019-07-17 NOTE — CONSULT LETTER
[Dear  ___] : Dear  [unfilled], [Courtesy Letter:] : I had the pleasure of seeing your patient, [unfilled], in my office today. [Please see my note below.] : Please see my note below. [Sincerely,] : Sincerely, [FreeTextEntry2] : Dr. Fiore (PCP / Ref)\par  [FreeTextEntry3] : Florencio Botello MD\par Department of Cardiovascular and Thoracic Surgery\par  \par [facility]

## 2019-07-17 NOTE — ASSESSMENT
[FreeTextEntry1] : 73 year old male S/P FB, Right Robotic Assisted Upper Lobe Wedge Resection, MLND on 12/7/2018. Pathology revealed: Infiltrating, moderately to poorly differentiated adenocarcinoma, pT1a, pN0.\par \par Chest CT on 7/11/19 revealed:\par - stable postsurgical change in the right lung apex, s/p wedge resection\par - stable 1.0 x 0.7 cm region of nodularity along the medial margin of the scar (6:19)\par - the visualized osseous structures shoe healed fractures of the right 5th, 6th, 7th, 8th, 9th and 10th ribs\par \par I have reviewed the patient's medical records and diagnostic images at time of this office consultation and have made the following recommendation:\par 1. Stable Chest CT, RTC in 3 mons with CT Chest w/o contrast. \par \par Written by Janet Skelton NP, acting as a scribe for Dr. Florencio Metcalf. \par \par The documentation recorded by the scribe accurately reflects the service I personally performed and the decisions made by me. FLORENCIO METCALF MD

## 2019-09-11 ENCOUNTER — OUTPATIENT (OUTPATIENT)
Dept: OUTPATIENT SERVICES | Facility: HOSPITAL | Age: 74
LOS: 1 days | Discharge: HOME | End: 2019-09-11
Payer: MEDICARE

## 2019-09-11 ENCOUNTER — RESULT REVIEW (OUTPATIENT)
Age: 74
End: 2019-09-11

## 2019-09-11 VITALS
HEART RATE: 74 BPM | WEIGHT: 167.99 LBS | SYSTOLIC BLOOD PRESSURE: 162 MMHG | TEMPERATURE: 97 F | HEIGHT: 66 IN | DIASTOLIC BLOOD PRESSURE: 84 MMHG | RESPIRATION RATE: 13 BRPM | OXYGEN SATURATION: 100 %

## 2019-09-11 DIAGNOSIS — Z98.890 OTHER SPECIFIED POSTPROCEDURAL STATES: Chronic | ICD-10-CM

## 2019-09-11 DIAGNOSIS — Z90.2 ACQUIRED ABSENCE OF LUNG [PART OF]: Chronic | ICD-10-CM

## 2019-09-11 DIAGNOSIS — D50.9 IRON DEFICIENCY ANEMIA, UNSPECIFIED: ICD-10-CM

## 2019-09-11 DIAGNOSIS — K31.1 ADULT HYPERTROPHIC PYLORIC STENOSIS: ICD-10-CM

## 2019-09-11 PROCEDURE — 88305 TISSUE EXAM BY PATHOLOGIST: CPT | Mod: 26

## 2019-09-11 PROCEDURE — 88305 TISSUE EXAM BY PATHOLOGIST: CPT

## 2019-09-11 RX ORDER — DULOXETINE HYDROCHLORIDE 30 MG/1
1 CAPSULE, DELAYED RELEASE ORAL
Qty: 0 | Refills: 0 | DISCHARGE

## 2019-09-11 RX ORDER — ASPIRIN/CALCIUM CARB/MAGNESIUM 324 MG
1 TABLET ORAL
Qty: 0 | Refills: 0 | DISCHARGE

## 2019-09-11 RX ORDER — ROPINIROLE 8 MG/1
1 TABLET, FILM COATED, EXTENDED RELEASE ORAL
Qty: 0 | Refills: 0 | DISCHARGE

## 2019-09-11 RX ORDER — LIDOCAINE 4 G/100G
0 CREAM TOPICAL
Qty: 0 | Refills: 0 | DISCHARGE

## 2019-09-11 RX ORDER — LOSARTAN POTASSIUM 100 MG/1
1 TABLET, FILM COATED ORAL
Qty: 0 | Refills: 0 | DISCHARGE

## 2019-09-11 RX ORDER — OMEGA-3 ACID ETHYL ESTERS 1 G
0 CAPSULE ORAL
Qty: 0 | Refills: 0 | DISCHARGE

## 2019-09-11 NOTE — ASU PREOP CHECKLIST - SITE MARKED BY SURGEON
FINAL REPORT



PROCEDURE:  XR FEMUR 2+V LT



TECHNIQUE:  LEFT femur radiographs, AP and lateral views. 



HISTORY:  thigh and hip pain 



COMPARISON:  No prior studies are available for comparison.



FINDINGS:  

Fracture (s) and/or Dislocation(s): None .



Joint space(s): Normal .



Soft tissues: Normal .



Bone mineralization: Normal .



Foreign bodies: None .







IMPRESSION:  

Normal Examination n/a

## 2019-09-15 DIAGNOSIS — D12.5 BENIGN NEOPLASM OF SIGMOID COLON: ICD-10-CM

## 2019-09-15 DIAGNOSIS — N18.9 CHRONIC KIDNEY DISEASE, UNSPECIFIED: ICD-10-CM

## 2019-09-15 DIAGNOSIS — Z79.82 LONG TERM (CURRENT) USE OF ASPIRIN: ICD-10-CM

## 2019-09-15 DIAGNOSIS — K64.8 OTHER HEMORRHOIDS: ICD-10-CM

## 2019-09-15 DIAGNOSIS — Z87.442 PERSONAL HISTORY OF URINARY CALCULI: ICD-10-CM

## 2019-09-15 DIAGNOSIS — Z85.118 PERSONAL HISTORY OF OTHER MALIGNANT NEOPLASM OF BRONCHUS AND LUNG: ICD-10-CM

## 2019-09-15 DIAGNOSIS — Z90.2 ACQUIRED ABSENCE OF LUNG [PART OF]: ICD-10-CM

## 2019-09-15 DIAGNOSIS — M06.9 RHEUMATOID ARTHRITIS, UNSPECIFIED: ICD-10-CM

## 2019-09-15 DIAGNOSIS — F17.210 NICOTINE DEPENDENCE, CIGARETTES, UNCOMPLICATED: ICD-10-CM

## 2019-09-15 DIAGNOSIS — D50.9 IRON DEFICIENCY ANEMIA, UNSPECIFIED: ICD-10-CM

## 2019-09-15 DIAGNOSIS — D12.4 BENIGN NEOPLASM OF DESCENDING COLON: ICD-10-CM

## 2019-09-15 DIAGNOSIS — I10 ESSENTIAL (PRIMARY) HYPERTENSION: ICD-10-CM

## 2019-09-15 DIAGNOSIS — I12.9 HYPERTENSIVE CHRONIC KIDNEY DISEASE WITH STAGE 1 THROUGH STAGE 4 CHRONIC KIDNEY DISEASE, OR UNSPECIFIED CHRONIC KIDNEY DISEASE: ICD-10-CM

## 2019-09-15 DIAGNOSIS — E78.5 HYPERLIPIDEMIA, UNSPECIFIED: ICD-10-CM

## 2020-01-09 NOTE — ASU PATIENT PROFILE, ADULT - FALL HARM RISK CONCLUSION
Encounter Summary
  Created on: 2020
 
 SantosVeda
 External Reference #: [id number]
 : 44
 Sex: Female
 
 Demographics
 
 
+-----------------------+----------------------+
| Address               | [address]      |
|                       | [place]  [address] |
+-----------------------+----------------------+
| Home Phone            | [phone]      |
+-----------------------+----------------------+
| Preferred Language    | Unknown              |
+-----------------------+----------------------+
| Marital Status        |               |
+-----------------------+----------------------+
| Faith Affiliation | [id number]                 |
+-----------------------+----------------------+
| Race                  | Unknown              |
+-----------------------+----------------------+
| Ethnic Group          | Unknown              |
+-----------------------+----------------------+
 
 
 Author
 
 
+--------------+--------------------------------------------+
| Author       | [facility]  |
|              | and [facility]                                |
+--------------+--------------------------------------------+
| Organization | [facility]  |
|              | and [facility]                                |
+--------------+--------------------------------------------+
| Address      | Unknown                                    |
+--------------+--------------------------------------------+
| Phone        | Unavailable                                |
+--------------+--------------------------------------------+
 
 
 
 Support
 
 
+----------------+--------------+-----------------+-----------------+
| Name           | Relationship | Address         | Phone           |
+----------------+--------------+-----------------+-----------------+
| Johan Santos | [place]         | [address] | [phone] |
|                |              | [place]  [address] |                 |
+----------------+--------------+-----------------+-----------------+
 
 
 
 
 Care Team Providers
 
 
+-----------------------+------+-------------+
| Care Team Member Name | Role | Phone       |
+-----------------------+------+-------------+
 PCP  | Unavailable |
+-----------------------+------+-------------+
 
 
 
 Encounter Details
 
 
+--------+-----------+----------------------+----------------------+-------------+
| Date   | Type      | Department           | Care Team            | Description |
+--------+-----------+----------------------+----------------------+-------------+
| / | [facility]  |   [facility] |   Jp Pike,   |             |
|    | Encounter |  [facility]  | MD  [address] [facility]     |             |
|        |           |  [address]  [place] | [place]      |             |
|        |           |  [place]           | [address]  [phone]  |             |
|        |           | [address]           |  [phone] (Fax)  |             |
|        |           | [phone]         |                      |             |
+--------+-----------+----------------------+----------------------+-------------+
 
 
 
 Social History
 
 
+----------------+-------+-----------+--------+------+
| Tobacco Use    | Types | Packs/Day | Years  | Date |
|                |       |           | Used   |      |
+----------------+-------+-----------+--------+------+
| Never Assessed |       |           |        |      |
+----------------+-------+-----------+--------+------+
 
 
 
+------------------+---------------+
| Sex Assigned at  | Date Recorded |
| Birth            |               |
+------------------+---------------+
| Not on file      |               |
+------------------+---------------+
 
 
 
+----------------+-------------+-------------+
| Job Start Date | Occupation  | Industry    |
+----------------+-------------+-------------+
| Not on file    | Not on file | Not on file |
+----------------+-------------+-------------+
 
 
 
+----------------+--------------+------------+
| Travel History | Travel Start | Travel End |
+----------------+--------------+------------+
 
 
 
 
+-------------------------------------+
| No recent travel history available. |
+-------------------------------------+
 documented as of this encounter
 
 Plan of Treatment
 
 
+--------+---------+------------+----------------------+-------------+
| Date   | Type    | Specialty  | Care Team            | Description |
+--------+---------+------------+----------------------+-------------+
| / | Office  | Nephrology |   Dante Escudero MD  |             |
| 2020   | Visit   |            |  [address] [facility]  |             |
|        |         |            | [address]  [place]   |             |
|        |         |            | [address]  [phone]  |             |
|        |         |            |  [phone] (Fax)  |             |
+--------+---------+------------+----------------------+-------------+
 documented as of this encounter
 
 Visit Diagnoses
 Not on filedocumented in this encounter [employer]

## 2020-07-02 ENCOUNTER — APPOINTMENT (OUTPATIENT)
Dept: DERMATOLOGY | Facility: CLINIC | Age: 75
End: 2020-07-02

## 2020-07-17 NOTE — ASU PATIENT PROFILE, ADULT - PAIN RATING AT ACTIVITY
Called and spoke to pt  Advised pt, spoke to  about what more she needs to submit pt's paperwork to insurance  Advised she needs a referral in Epic from PCP to [facility] and for her cardiologist to document that she is cleared for bariatric surgery  Once we have that we can then submit for authorization for sx  Pt verbalizes understanding and will call her doctors 
6

## 2020-10-16 VITALS
DIASTOLIC BLOOD PRESSURE: 78 MMHG | BODY MASS INDEX: 25.34 KG/M2 | TEMPERATURE: 97 F | SYSTOLIC BLOOD PRESSURE: 148 MMHG | WEIGHT: 157 LBS

## 2021-01-05 ENCOUNTER — OUTPATIENT (OUTPATIENT)
Dept: OUTPATIENT SERVICES | Facility: HOSPITAL | Age: 76
LOS: 1 days | End: 2021-01-05
Payer: MEDICARE

## 2021-01-05 DIAGNOSIS — Z90.2 ACQUIRED ABSENCE OF LUNG [PART OF]: Chronic | ICD-10-CM

## 2021-01-05 DIAGNOSIS — Z20.828 CONTACT WITH AND (SUSPECTED) EXPOSURE TO OTHER VIRAL COMMUNICABLE DISEASES: ICD-10-CM

## 2021-01-05 DIAGNOSIS — Z98.890 OTHER SPECIFIED POSTPROCEDURAL STATES: Chronic | ICD-10-CM

## 2021-01-05 LAB — SARS-COV-2 RNA SPEC QL NAA+PROBE: SIGNIFICANT CHANGE UP

## 2021-01-05 PROCEDURE — U0005: CPT

## 2021-01-05 PROCEDURE — C9803: CPT

## 2021-01-05 PROCEDURE — U0003: CPT

## 2021-01-08 ENCOUNTER — APPOINTMENT (OUTPATIENT)
Dept: CT IMAGING | Facility: HOSPITAL | Age: 76
End: 2021-01-08

## 2021-01-27 ENCOUNTER — OUTPATIENT (OUTPATIENT)
Dept: OUTPATIENT SERVICES | Facility: HOSPITAL | Age: 76
LOS: 1 days | End: 2021-01-27
Payer: MEDICARE

## 2021-01-27 DIAGNOSIS — Z90.2 ACQUIRED ABSENCE OF LUNG [PART OF]: Chronic | ICD-10-CM

## 2021-01-27 DIAGNOSIS — Z11.52 ENCOUNTER FOR SCREENING FOR COVID-19: ICD-10-CM

## 2021-01-27 DIAGNOSIS — Z98.890 OTHER SPECIFIED POSTPROCEDURAL STATES: Chronic | ICD-10-CM

## 2021-01-27 LAB — SARS-COV-2 RNA SPEC QL NAA+PROBE: SIGNIFICANT CHANGE UP

## 2021-01-27 PROCEDURE — U0003: CPT

## 2021-01-27 PROCEDURE — C9803: CPT

## 2021-01-27 PROCEDURE — U0005: CPT

## 2021-01-29 ENCOUNTER — OUTPATIENT (OUTPATIENT)
Dept: OUTPATIENT SERVICES | Facility: HOSPITAL | Age: 76
LOS: 1 days | End: 2021-01-29
Payer: MEDICARE

## 2021-01-29 ENCOUNTER — RESULT REVIEW (OUTPATIENT)
Age: 76
End: 2021-01-29

## 2021-01-29 ENCOUNTER — APPOINTMENT (OUTPATIENT)
Dept: CT IMAGING | Facility: HOSPITAL | Age: 76
End: 2021-01-29

## 2021-01-29 DIAGNOSIS — D47.2 MONOCLONAL GAMMOPATHY: ICD-10-CM

## 2021-01-29 DIAGNOSIS — D64.9 ANEMIA, UNSPECIFIED: ICD-10-CM

## 2021-01-29 DIAGNOSIS — D72.821 MONOCYTOSIS (SYMPTOMATIC): ICD-10-CM

## 2021-01-29 DIAGNOSIS — Z98.890 OTHER SPECIFIED POSTPROCEDURAL STATES: Chronic | ICD-10-CM

## 2021-01-29 DIAGNOSIS — Z00.00 ENCOUNTER FOR GENERAL ADULT MEDICAL EXAMINATION WITHOUT ABNORMAL FINDINGS: ICD-10-CM

## 2021-01-29 DIAGNOSIS — Z90.2 ACQUIRED ABSENCE OF LUNG [PART OF]: Chronic | ICD-10-CM

## 2021-01-29 LAB
APTT BLD: 29.1 SEC — SIGNIFICANT CHANGE UP (ref 27.5–35.5)
HCT VFR BLD CALC: 36.1 % — LOW (ref 39–50)
HGB BLD-MCNC: 11.7 G/DL — LOW (ref 13–17)
INR BLD: 0.97 RATIO — SIGNIFICANT CHANGE UP (ref 0.88–1.16)
MCHC RBC-ENTMCNC: 30.9 PG — SIGNIFICANT CHANGE UP (ref 27–34)
MCHC RBC-ENTMCNC: 32.4 GM/DL — SIGNIFICANT CHANGE UP (ref 32–36)
MCV RBC AUTO: 95.3 FL — SIGNIFICANT CHANGE UP (ref 80–100)
NRBC # BLD: 0 /100 WBCS — SIGNIFICANT CHANGE UP (ref 0–0)
PLATELET # BLD AUTO: 423 K/UL — HIGH (ref 150–400)
PROTHROM AB SERPL-ACNC: 11.6 SEC — SIGNIFICANT CHANGE UP (ref 10.6–13.6)
RBC # BLD: 3.79 M/UL — LOW (ref 4.2–5.8)
RBC # FLD: 15 % — HIGH (ref 10.3–14.5)
WBC # BLD: 12.98 K/UL — HIGH (ref 3.8–10.5)
WBC # FLD AUTO: 12.98 K/UL — HIGH (ref 3.8–10.5)

## 2021-01-29 PROCEDURE — 88237 TISSUE CULTURE BONE MARROW: CPT

## 2021-01-29 PROCEDURE — 88360 TUMOR IMMUNOHISTOCHEM/MANUAL: CPT | Mod: 26

## 2021-01-29 PROCEDURE — 88185 FLOWCYTOMETRY/TC ADD-ON: CPT

## 2021-01-29 PROCEDURE — 88305 TISSUE EXAM BY PATHOLOGIST: CPT

## 2021-01-29 PROCEDURE — 88189 FLOWCYTOMETRY/READ 16 & >: CPT

## 2021-01-29 PROCEDURE — 88360 TUMOR IMMUNOHISTOCHEM/MANUAL: CPT

## 2021-01-29 PROCEDURE — 38221 DX BONE MARROW BIOPSIES: CPT | Mod: 50

## 2021-01-29 PROCEDURE — 87205 SMEAR GRAM STAIN: CPT

## 2021-01-29 PROCEDURE — 88264 CHROMOSOME ANALYSIS 20-25: CPT

## 2021-01-29 PROCEDURE — 88313 SPECIAL STAINS GROUP 2: CPT

## 2021-01-29 PROCEDURE — 85027 COMPLETE CBC AUTOMATED: CPT

## 2021-01-29 PROCEDURE — 88305 TISSUE EXAM BY PATHOLOGIST: CPT | Mod: 26

## 2021-01-29 PROCEDURE — 88285 CHROMOSOME COUNT ADDITIONAL: CPT

## 2021-01-29 PROCEDURE — 88271 CYTOGENETICS DNA PROBE: CPT

## 2021-01-29 PROCEDURE — 88365 INSITU HYBRIDIZATION (FISH): CPT

## 2021-01-29 PROCEDURE — 85097 BONE MARROW INTERPRETATION: CPT

## 2021-01-29 PROCEDURE — 88275 CYTOGENETICS 100-300: CPT

## 2021-01-29 PROCEDURE — 85730 THROMBOPLASTIN TIME PARTIAL: CPT

## 2021-01-29 PROCEDURE — 85610 PROTHROMBIN TIME: CPT

## 2021-01-29 PROCEDURE — 88364 INSITU HYBRIDIZATION (FISH): CPT

## 2021-01-29 PROCEDURE — 88184 FLOWCYTOMETRY/ TC 1 MARKER: CPT

## 2021-01-29 PROCEDURE — 88280 CHROMOSOME KARYOTYPE STUDY: CPT

## 2021-01-29 PROCEDURE — 88313 SPECIAL STAINS GROUP 2: CPT | Mod: 26

## 2021-01-29 PROCEDURE — 88364 INSITU HYBRIDIZATION (FISH): CPT | Mod: 26

## 2021-01-29 PROCEDURE — 38221 DX BONE MARROW BIOPSIES: CPT

## 2021-01-29 PROCEDURE — 88365 INSITU HYBRIDIZATION (FISH): CPT | Mod: 26,59

## 2021-01-29 PROCEDURE — 77012 CT SCAN FOR NEEDLE BIOPSY: CPT

## 2021-01-29 PROCEDURE — 77012 CT SCAN FOR NEEDLE BIOPSY: CPT | Mod: 26

## 2021-02-02 LAB — TM INTERPRETATION: SIGNIFICANT CHANGE UP

## 2021-02-04 LAB — HEMATOPATHOLOGY REPORT: SIGNIFICANT CHANGE UP

## 2021-02-09 LAB — CHROM ANALY INTERPHASE BLD FISH-IMP: SIGNIFICANT CHANGE UP

## 2021-02-10 LAB — CHROM ANALY OVERALL INTERP SPEC-IMP: SIGNIFICANT CHANGE UP

## 2021-03-20 ENCOUNTER — OUTPATIENT (OUTPATIENT)
Dept: OUTPATIENT SERVICES | Facility: HOSPITAL | Age: 76
LOS: 1 days | End: 2021-03-20
Payer: MEDICARE

## 2021-03-20 DIAGNOSIS — Z20.828 CONTACT WITH AND (SUSPECTED) EXPOSURE TO OTHER VIRAL COMMUNICABLE DISEASES: ICD-10-CM

## 2021-03-20 DIAGNOSIS — Z90.2 ACQUIRED ABSENCE OF LUNG [PART OF]: Chronic | ICD-10-CM

## 2021-03-20 DIAGNOSIS — Z98.890 OTHER SPECIFIED POSTPROCEDURAL STATES: Chronic | ICD-10-CM

## 2021-03-20 LAB — SARS-COV-2 RNA SPEC QL NAA+PROBE: SIGNIFICANT CHANGE UP

## 2021-03-20 PROCEDURE — U0005: CPT

## 2021-03-20 PROCEDURE — U0003: CPT

## 2021-03-22 ENCOUNTER — OUTPATIENT (OUTPATIENT)
Dept: OUTPATIENT SERVICES | Facility: HOSPITAL | Age: 76
LOS: 1 days | End: 2021-03-22
Payer: MEDICARE

## 2021-03-22 DIAGNOSIS — M54.16 RADICULOPATHY, LUMBAR REGION: ICD-10-CM

## 2021-03-22 DIAGNOSIS — Z90.2 ACQUIRED ABSENCE OF LUNG [PART OF]: Chronic | ICD-10-CM

## 2021-03-22 DIAGNOSIS — Z98.890 OTHER SPECIFIED POSTPROCEDURAL STATES: Chronic | ICD-10-CM

## 2021-03-22 PROCEDURE — 62323 NJX INTERLAMINAR LMBR/SAC: CPT

## 2021-04-23 ENCOUNTER — OUTPATIENT (OUTPATIENT)
Dept: OUTPATIENT SERVICES | Facility: HOSPITAL | Age: 76
LOS: 1 days | End: 2021-04-23
Payer: MEDICARE

## 2021-04-23 DIAGNOSIS — Z98.890 OTHER SPECIFIED POSTPROCEDURAL STATES: Chronic | ICD-10-CM

## 2021-04-23 DIAGNOSIS — Z90.2 ACQUIRED ABSENCE OF LUNG [PART OF]: Chronic | ICD-10-CM

## 2021-04-23 DIAGNOSIS — Z20.828 CONTACT WITH AND (SUSPECTED) EXPOSURE TO OTHER VIRAL COMMUNICABLE DISEASES: ICD-10-CM

## 2021-04-23 LAB — SARS-COV-2 RNA SPEC QL NAA+PROBE: SIGNIFICANT CHANGE UP

## 2021-04-23 PROCEDURE — U0005: CPT

## 2021-04-23 PROCEDURE — U0003: CPT

## 2021-04-26 ENCOUNTER — OUTPATIENT (OUTPATIENT)
Dept: OUTPATIENT SERVICES | Facility: HOSPITAL | Age: 76
LOS: 1 days | End: 2021-04-26
Payer: MEDICARE

## 2021-04-26 DIAGNOSIS — Z90.2 ACQUIRED ABSENCE OF LUNG [PART OF]: Chronic | ICD-10-CM

## 2021-04-26 DIAGNOSIS — Z98.890 OTHER SPECIFIED POSTPROCEDURAL STATES: Chronic | ICD-10-CM

## 2021-04-26 DIAGNOSIS — M54.16 RADICULOPATHY, LUMBAR REGION: ICD-10-CM

## 2021-04-26 PROCEDURE — 62323 NJX INTERLAMINAR LMBR/SAC: CPT

## 2021-05-11 ENCOUNTER — OUTPATIENT (OUTPATIENT)
Dept: OUTPATIENT SERVICES | Facility: HOSPITAL | Age: 76
LOS: 1 days | End: 2021-05-11
Payer: MEDICARE

## 2021-05-11 DIAGNOSIS — Z98.890 OTHER SPECIFIED POSTPROCEDURAL STATES: Chronic | ICD-10-CM

## 2021-05-11 DIAGNOSIS — Z20.828 CONTACT WITH AND (SUSPECTED) EXPOSURE TO OTHER VIRAL COMMUNICABLE DISEASES: ICD-10-CM

## 2021-05-11 DIAGNOSIS — Z90.2 ACQUIRED ABSENCE OF LUNG [PART OF]: Chronic | ICD-10-CM

## 2021-05-11 LAB — SARS-COV-2 RNA SPEC QL NAA+PROBE: SIGNIFICANT CHANGE UP

## 2021-05-11 PROCEDURE — U0005: CPT

## 2021-05-11 PROCEDURE — U0003: CPT

## 2021-05-13 ENCOUNTER — OUTPATIENT (OUTPATIENT)
Dept: OUTPATIENT SERVICES | Facility: HOSPITAL | Age: 76
LOS: 1 days | End: 2021-05-13
Payer: MEDICARE

## 2021-05-13 DIAGNOSIS — M54.16 RADICULOPATHY, LUMBAR REGION: ICD-10-CM

## 2021-05-13 DIAGNOSIS — Z98.890 OTHER SPECIFIED POSTPROCEDURAL STATES: Chronic | ICD-10-CM

## 2021-05-13 DIAGNOSIS — Z90.2 ACQUIRED ABSENCE OF LUNG [PART OF]: Chronic | ICD-10-CM

## 2021-05-13 PROCEDURE — 62323 NJX INTERLAMINAR LMBR/SAC: CPT

## 2021-06-16 ENCOUNTER — APPOINTMENT (OUTPATIENT)
Dept: ORTHOPEDIC SURGERY | Facility: CLINIC | Age: 76
End: 2021-06-16
Payer: MEDICARE

## 2021-06-16 VITALS
BODY MASS INDEX: 24.99 KG/M2 | TEMPERATURE: 98 F | WEIGHT: 150 LBS | HEIGHT: 65 IN | SYSTOLIC BLOOD PRESSURE: 116 MMHG | DIASTOLIC BLOOD PRESSURE: 82 MMHG | HEART RATE: 93 BPM

## 2021-06-16 DIAGNOSIS — Z72.3 LACK OF PHYSICAL EXERCISE: ICD-10-CM

## 2021-06-16 DIAGNOSIS — Z87.39 PERSONAL HISTORY OF OTHER DISEASES OF THE MUSCULOSKELETAL SYSTEM AND CONNECTIVE TISSUE: ICD-10-CM

## 2021-06-16 PROCEDURE — 99203 OFFICE O/P NEW LOW 30 MIN: CPT

## 2021-06-16 PROCEDURE — 72070 X-RAY EXAM THORAC SPINE 2VWS: CPT

## 2021-06-16 PROCEDURE — 72110 X-RAY EXAM L-2 SPINE 4/>VWS: CPT

## 2021-08-19 NOTE — PROGRESS NOTE ADULT - SUBJECTIVE AND OBJECTIVE BOX
Patient : Leeann Huggins Age: 53 year old Sex: male   MRN: [id number] Encounter Date: 8/19/2021      History     Chief Complaint   Patient presents with   • Foot Pain     HPI  Patient is a 53-year-old male Covid positive for approximately 10 days who presents to the emergency department for right toe and foot pain.  Patient states that 2 days ago symptoms started, they progressed into a burning sensation in his right midfoot distal around the right big toe.  He states walking made this worse, rest made it better.  Denies any history of gout, patient is on hydrochlorothiazide for hypertension also history of hyperlipidemia.  States he has been able to ambulate on the treadmill for 15 minutes a day despite this discomfort.  Does not smoke drink or use illicit drugs.  Denies fevers, chest pain, shortness of breath, weakness.      Allergies   Allergen Reactions   • Cat Dander SHORTNESS OF BREATH       Current Discharge Medication List      Prior to Admission Medications    Details   hydrochlorothiazide (HYDRODIURIL) 25 MG tablet Take 1 tablet by mouth daily.  Qty: 90 tablet, Refills: 0      atorvastatin (LIPITOR) 10 MG tablet Take 1 tablet by mouth daily.  Qty: 90 tablet, Refills: 0      pantoprazole (PROTONIX) 40 MG tablet Take 1 tablet by mouth daily.  Qty: 30 tablet, Refills: 2      ketoconazole (NIZORAL) 2 % cream CARMEN EXT AA BID PRN  Refills: 1      clobetasol (TEMOVATE) 0.05 % cream Apply topically 2 times daily.  Qty: 30 g, Refills: 2             Current Discharge Medication List          Past Medical History:   Diagnosis Date   • Hyperlipidemia 1/3/2020   • Hypertension 1/3/2020       No past surgical history on file.    No family history on file.    Social History     Tobacco Use   • Smoking status: Former Smoker     Packs/day: 1.00     Years: 10.00     Pack years: 10.00   • Smokeless tobacco: Never Used   • Tobacco comment: Discontinued 17 yearsback.    Substance Use Topics   • Alcohol use: Yes   • Drug use:  Never       Review of Systems   Constitutional: Negative for chills, fatigue and fever.   HENT: Negative for congestion, ear pain, hearing loss and tinnitus.    Eyes: Negative for pain, discharge and visual disturbance.   Respiratory: Negative for cough, shortness of breath and wheezing.    Cardiovascular: Negative for chest pain and palpitations.   Gastrointestinal: Negative for abdominal pain, blood in stool and vomiting.   Endocrine: Negative for cold intolerance and heat intolerance.   Genitourinary: Negative for difficulty urinating, dysuria and flank pain.   Musculoskeletal: Positive for arthralgias. Negative for myalgias.   Skin: Negative for pallor and rash.   Neurological: Negative for dizziness, light-headedness and headaches.   Psychiatric/Behavioral: Negative for agitation and dysphoric mood.       Physical Exam     ED Triage Vitals   ED Triage Vitals Group      Temp 08/19/21 1408 95.9 °F (35.5 °C)      Heart Rate 08/19/21 1407 85      Resp 08/19/21 1407 18      BP 08/19/21 1407 (!) 175/103      SpO2 08/19/21 1407 96 %      EtCO2 mmHg --       Height --       Weight 08/19/21 1407 211 lb 10.3 oz (96 kg)      Weight Scale Used --       BMI (Calculated) --       IBW/kg (Calculated) --        Physical Exam  General: Well appearing, in no acute distress  Skin: Well perfused, no rashes   Head: Normocephalic atraumatic  Neck: soft supple able to range neck without pain.  Chest: Regular rate, regular rhythm, no peripheral edema.  Lung: Equal chest rise bilaterally, nonlabored respirations  Abdomen: Soft, nontender, nondistended, negative Johnson's, negative McBurney's.  MSK: Equal muscle strength bilaterally upper and lower extremities, no sensation changes.  Intact DP pulse on the right, minimal pain on palpation and manipulation of the foot at the MCP joint and base of the first toe. Minimal redness to the base of the first toe on right, no swelling. Mild warmth to touch.   Neuro: Alert and oriented x3, no  Pt is c/o Rt chest wall pain from surgery, no SOB, fevers or chills.     Date of Service: 12-08-18 @ 09:20    Vital Signs Last 24 Hrs  T(C): 36.9 (07 Dec 2018 20:48), Max: 37.5 (07 Dec 2018 15:15)  T(F): 98.5 (07 Dec 2018 20:48), Max: 99.5 (07 Dec 2018 15:15)  HR: 76 (08 Dec 2018 07:00) (72 - 99)  BP: 127/63 (08 Dec 2018 07:00) (104/60 - 161/81)  BP(mean): 75 (08 Dec 2018 07:00) (70 - 90)  RR: 12 (08 Dec 2018 07:00) (8 - 24)  SpO2: 100% (08 Dec 2018 07:00) (88% - 100%)    Daily Height in cm: 167.64 (07 Dec 2018 09:49)    Daily     I&O's Detail    07 Dec 2018 07:01  -  08 Dec 2018 07:00  --------------------------------------------------------  IN:    Other: 1400 mL    sodium chloride 0.9%: 82 mL  Total IN: 1482 mL    OUT:    Chest Tube: 100 mL    Indwelling Catheter - Urethral: 1330 mL  Total OUT: 1430 mL    Total NET: 52 mL          CAPILLARY BLOOD GLUCOSE                                          10.5   17.95 )-----------( 357      ( 08 Dec 2018 05:20 )             32.1       12-08    139  |  109<H>  |  21  ----------------------------<  92  4.3   |  21<L>  |  1.55<H>    Ca    8.0<L>      08 Dec 2018 05:20        MEDICATIONS  (STANDING):  acetaminophen  IVPB .. 1000 milliGRAM(s) IV Intermittent once  atorvastatin 10 milliGRAM(s) Oral at bedtime  docusate sodium 100 milliGRAM(s) Oral three times a day  DULoxetine 60 milliGRAM(s) Oral daily  heparin  Injectable 5000 Unit(s) SubCutaneous every 8 hours  HYDROmorphone PCA (1 mG/mL) 30 milliLiter(s) PCA Continuous PCA Continuous  losartan 25 milliGRAM(s) Oral daily  senna 2 Tablet(s) Oral at bedtime    MEDICATIONS  (PRN):  HYDROmorphone PCA (1 mG/mL) Rescue Clinician Bolus 0.5 milliGRAM(s) IV Push every 15 minutes PRN for Pain Scale GREATER THAN 6  melatonin 5 milliGRAM(s) Oral at bedtime PRN Sleep  morphine  - Injectable 2 milliGRAM(s) IV Push every 3 hours PRN Severe Pain (7 - 10)  naloxone Injectable 0.1 milliGRAM(s) IV Push every 3 minutes PRN For ANY of the following changes in patient status:  A. RR LESS THAN 10 breaths per minute, B. Oxygen saturation LESS THAN 90%, C. Sedation score of 6  ondansetron Injectable 4 milliGRAM(s) IV Push every 6 hours PRN Nausea  oxyCODONE    5 mG/acetaminophen 325 mG 1 Tablet(s) Oral every 4 hours PRN Mild Pain (1 - 3)  oxyCODONE    5 mG/acetaminophen 325 mG 2 Tablet(s) Oral every 4 hours PRN Moderate Pain (4 - 6)  rOPINIRole 1 milliGRAM(s) Oral two times a day PRN restless leg syndrome  rOPINIRole 2 milliGRAM(s) Oral at bedtime PRN Restless leg syndrome weakness appreciated, cranial nerves intact  Psych: Appropriate affect and behavior       Lab Results     No results found for this visit on 08/19/21.      Radiology Results     Imaging Results          US VASC LOWER EXTREMITY VENOUS DUPLEX RIGHT (Final result)  Result time 08/19/21 15:48:12    Final result                 Impression:    No evidence for right lower extremity DVT.     Electronically Signed by: JEANA FERRIS D.O.   Signed on: 8/19/2021 3:48 PM                Narrative:    EXAM: Right lower extremity venous duplex.    CLINICAL INDICATION: Pain.  Swelling.    COMPARISON: None.    FINDINGS:  Color duplex sonography.    The right common femoral, femoral,  and popliteal veins demonstrate  compressibility and Doppler color flow  signal. The Doppler spectral waveforms show phasic variation. Flow is  demonstrated at the origin of the right greater saphenous and deep femoral  veins. Flow is demonstrated in  portions of right calf veins.                                XR FOOT MIN 3 VIEWS RIGHT (Final result)  Result time 08/19/21 14:42:08    Final result                 Impression:    FINDINGS AND IMPRESSION: Right foot 3 views.  Intact bony structures with  normal alignment and preserved joint spaces.        Electronically Signed by: HUBERT VENEGAS M.D.   Signed on: 8/19/2021 2:42 PM                Narrative:    EXAM: XR FOOT MIN 3 VIEWS RIGHT    CLINICAL INDICATION: Right foot pain    COMPARISON: 01/03/2020                                ED Medication Orders (From admission, onward)    Ordered Start     Status Ordering Provider    08/19/21 1502 08/19/21 1503  ibuprofen (MOTRIN) tablet 600 mg  ONCE      Last MAR action: Given EMEKA WAGNER  Patient is a 53-year-old male with history as described above who presents to the emergency department for foot pain.  Patient is hypertensive afebrile not tachycardic nontoxic-appearing.  Patient presents for evaluation of a blood clot.  He states he  called his primary care doctor and told him about the foot pain and they said she is come be evaluated for blood clot.  Denies any history of blood clots, no calf or leg pain on exam.  Does not describe any history of calf or leg pain.  Isolated proximal toe and distal foot pain.  No history of trauma however will obtain x-ray to evaluate further.  I have considered gout however the patient does not have any significant redness, warmth or pain in the actual joint.  No history of gout.  Patient is also on a thiazide which may increase his risk of gout.  Will give ibuprofen for pain control here.  Will evaluate after imaging, will likely discharge with primary care follow-up. Will also give Podiatry follow up.   ED Course       ED Course as of Aug 19 1628   Thu Aug 19, 2021   2955 IMPRESSION:  FINDINGS AND IMPRESSION: Right foot 3 views.  Intact bony structures with  normal alignment and preserved joint spaces.           Electronically Signed by: HUBERT VENEGAS M.D.   Signed on: 8/19/2021 2:42 PM     [AG]   1554 IMPRESSION:  No evidence for right lower extremity DVT.      Electronically Signed by: JEANA FERRIS D.O.   Signed on: 8/19/2021 3:48 PM     [AG]   1554 Ultrasound negative for DVT, patient feels improved after ibuprofen instructed to take over-the-counter ibuprofen as needed.  Plan to discharge patient with primary care follow-up.  Patient understands plan all questions answered.    [AG]      ED Course User Index  [AG] Juan Antonio Cosme DO       Procedures    Clinical Impression     ED Diagnosis   1. Right foot pain         Disposition        Discharge 8/19/2021  4:27 PM  Leeann Huggins discharge to home/self care.                         Juan Antonio Cosme DO  Resident  08/19/21 2299

## 2021-11-09 ENCOUNTER — APPOINTMENT (OUTPATIENT)
Dept: RHEUMATOLOGY | Facility: CLINIC | Age: 76
End: 2021-11-09

## 2021-11-10 ENCOUNTER — NON-APPOINTMENT (OUTPATIENT)
Age: 76
End: 2021-11-10

## 2021-11-10 ENCOUNTER — APPOINTMENT (OUTPATIENT)
Dept: RHEUMATOLOGY | Facility: CLINIC | Age: 76
End: 2021-11-10
Payer: MEDICARE

## 2021-11-10 VITALS
SYSTOLIC BLOOD PRESSURE: 127 MMHG | HEIGHT: 65 IN | BODY MASS INDEX: 26.66 KG/M2 | DIASTOLIC BLOOD PRESSURE: 83 MMHG | OXYGEN SATURATION: 96 % | HEART RATE: 106 BPM | WEIGHT: 160 LBS

## 2021-11-10 DIAGNOSIS — M25.511 PAIN IN RIGHT SHOULDER: ICD-10-CM

## 2021-11-10 PROCEDURE — 99204 OFFICE O/P NEW MOD 45 MIN: CPT | Mod: 25

## 2021-11-10 PROCEDURE — 20550 NJX 1 TENDON SHEATH/LIGAMENT: CPT

## 2021-11-10 RX ORDER — METHYLPRED ACET/NACL,ISO-OS/PF 40 MG/ML
40 VIAL (ML) INJECTION
Qty: 1 | Refills: 0 | Status: COMPLETED | OUTPATIENT
Start: 2021-11-10

## 2021-11-10 RX ADMIN — METHYLPREDNISOLONE ACETATE MG/ML: 40 INJECTION, SUSPENSION INTRA-ARTICULAR; INTRALESIONAL; INTRAMUSCULAR; SOFT TISSUE at 00:00

## 2022-02-03 ENCOUNTER — APPOINTMENT (OUTPATIENT)
Dept: RHEUMATOLOGY | Facility: CLINIC | Age: 77
End: 2022-02-03

## 2022-12-13 VITALS — WEIGHT: 160 LBS | BODY MASS INDEX: 25.71 KG/M2 | HEIGHT: 66 IN

## 2023-01-06 ENCOUNTER — NON-APPOINTMENT (OUTPATIENT)
Age: 78
End: 2023-01-06

## 2023-01-06 DIAGNOSIS — G89.29 OTHER CHRONIC PAIN: ICD-10-CM

## 2023-01-06 DIAGNOSIS — Z92.89 PERSONAL HISTORY OF OTHER MEDICAL TREATMENT: ICD-10-CM

## 2023-01-06 DIAGNOSIS — Z79.891 LONG TERM (CURRENT) USE OF OPIATE ANALGESIC: ICD-10-CM

## 2023-01-06 DIAGNOSIS — F11.20 OPIOID DEPENDENCE, UNCOMPLICATED: ICD-10-CM

## 2023-01-06 DIAGNOSIS — M54.9 DORSALGIA, UNSPECIFIED: ICD-10-CM

## 2023-01-06 DIAGNOSIS — Z86.59 PERSONAL HISTORY OF OTHER MENTAL AND BEHAVIORAL DISORDERS: ICD-10-CM

## 2023-01-06 DIAGNOSIS — Z87.11 PERSONAL HISTORY OF PEPTIC ULCER DISEASE: ICD-10-CM

## 2023-01-06 DIAGNOSIS — G89.4 CHRONIC PAIN SYNDROME: ICD-10-CM

## 2023-01-06 DIAGNOSIS — Z87.39 PERSONAL HISTORY OF OTHER DISEASES OF THE MUSCULOSKELETAL SYSTEM AND CONNECTIVE TISSUE: ICD-10-CM

## 2023-01-06 RX ORDER — OMEPRAZOLE 40 MG/1
40 CAPSULE, DELAYED RELEASE ORAL DAILY
Refills: 0 | Status: ACTIVE | COMMUNITY

## 2023-01-06 RX ORDER — KRILL/OM-3/DHA/EPA/PHOSPHO/AST 1000-230MG
81 CAPSULE ORAL DAILY
Refills: 0 | Status: ACTIVE | COMMUNITY

## 2023-01-06 RX ORDER — AMLODIPINE BESYLATE 5 MG/1
5 TABLET ORAL DAILY
Refills: 0 | Status: ACTIVE | COMMUNITY

## 2023-01-06 RX ORDER — DULOXETINE HYDROCHLORIDE 30 MG/1
30 CAPSULE, DELAYED RELEASE PELLETS ORAL DAILY
Refills: 0 | Status: ACTIVE | COMMUNITY

## 2023-01-06 RX ORDER — TIZANIDINE 4 MG/1
4 TABLET ORAL 3 TIMES DAILY
Refills: 0 | Status: ACTIVE | COMMUNITY

## 2023-01-07 ENCOUNTER — EMERGENCY (EMERGENCY)
Facility: HOSPITAL | Age: 78
LOS: 1 days | End: 2023-01-07
Attending: EMERGENCY MEDICINE
Payer: MEDICARE

## 2023-01-07 VITALS
DIASTOLIC BLOOD PRESSURE: 89 MMHG | SYSTOLIC BLOOD PRESSURE: 161 MMHG | OXYGEN SATURATION: 100 % | TEMPERATURE: 98 F | HEART RATE: 100 BPM | RESPIRATION RATE: 16 BRPM | WEIGHT: 160.06 LBS

## 2023-01-07 DIAGNOSIS — Z98.890 OTHER SPECIFIED POSTPROCEDURAL STATES: Chronic | ICD-10-CM

## 2023-01-07 DIAGNOSIS — Z90.2 ACQUIRED ABSENCE OF LUNG [PART OF]: Chronic | ICD-10-CM

## 2023-01-07 LAB
ALBUMIN SERPL ELPH-MCNC: 3.8 G/DL — SIGNIFICANT CHANGE UP (ref 3.3–5.2)
ALP SERPL-CCNC: 70 U/L — SIGNIFICANT CHANGE UP (ref 40–120)
ALT FLD-CCNC: 6 U/L — SIGNIFICANT CHANGE UP
AMPHET UR-MCNC: NEGATIVE — SIGNIFICANT CHANGE UP
ANION GAP SERPL CALC-SCNC: 11 MMOL/L — SIGNIFICANT CHANGE UP (ref 5–17)
ANISOCYTOSIS BLD QL: SLIGHT — SIGNIFICANT CHANGE UP
APAP SERPL-MCNC: <3 UG/ML — LOW (ref 10–26)
APPEARANCE UR: CLEAR — SIGNIFICANT CHANGE UP
AST SERPL-CCNC: 17 U/L — SIGNIFICANT CHANGE UP
BARBITURATES UR SCN-MCNC: NEGATIVE — SIGNIFICANT CHANGE UP
BASOPHILS # BLD AUTO: 0.21 K/UL — HIGH (ref 0–0.2)
BASOPHILS NFR BLD AUTO: 1.7 % — SIGNIFICANT CHANGE UP (ref 0–2)
BENZODIAZ UR-MCNC: NEGATIVE — SIGNIFICANT CHANGE UP
BILIRUB SERPL-MCNC: <0.2 MG/DL — LOW (ref 0.4–2)
BILIRUB UR-MCNC: NEGATIVE — SIGNIFICANT CHANGE UP
BUN SERPL-MCNC: 29.4 MG/DL — HIGH (ref 8–20)
CALCIUM SERPL-MCNC: 9 MG/DL — SIGNIFICANT CHANGE UP (ref 8.4–10.5)
CHLORIDE SERPL-SCNC: 106 MMOL/L — SIGNIFICANT CHANGE UP (ref 96–108)
CK SERPL-CCNC: 53 U/L — SIGNIFICANT CHANGE UP (ref 30–200)
CO2 SERPL-SCNC: 24 MMOL/L — SIGNIFICANT CHANGE UP (ref 22–29)
COCAINE METAB.OTHER UR-MCNC: NEGATIVE — SIGNIFICANT CHANGE UP
COLOR SPEC: YELLOW — SIGNIFICANT CHANGE UP
CREAT SERPL-MCNC: 1.8 MG/DL — HIGH (ref 0.5–1.3)
DIFF PNL FLD: ABNORMAL
EGFR: 39 ML/MIN/1.73M2 — LOW
EOSINOPHIL # BLD AUTO: 0.88 K/UL — HIGH (ref 0–0.5)
EOSINOPHIL NFR BLD AUTO: 7 % — HIGH (ref 0–6)
EPI CELLS # UR: SIGNIFICANT CHANGE UP
ETHANOL SERPL-MCNC: <10 MG/DL — SIGNIFICANT CHANGE UP (ref 0–9)
FLUAV AG NPH QL: SIGNIFICANT CHANGE UP
FLUBV AG NPH QL: SIGNIFICANT CHANGE UP
GIANT PLATELETS BLD QL SMEAR: PRESENT — SIGNIFICANT CHANGE UP
GLUCOSE SERPL-MCNC: 81 MG/DL — SIGNIFICANT CHANGE UP (ref 70–99)
GLUCOSE UR QL: NEGATIVE MG/DL — SIGNIFICANT CHANGE UP
HCT VFR BLD CALC: 28 % — LOW (ref 39–50)
HGB BLD-MCNC: 9.2 G/DL — LOW (ref 13–17)
KETONES UR-MCNC: NEGATIVE — SIGNIFICANT CHANGE UP
LEUKOCYTE ESTERASE UR-ACNC: NEGATIVE — SIGNIFICANT CHANGE UP
LYMPHOCYTES # BLD AUTO: 2.83 K/UL — SIGNIFICANT CHANGE UP (ref 1–3.3)
LYMPHOCYTES # BLD AUTO: 22.6 % — SIGNIFICANT CHANGE UP (ref 13–44)
MACROCYTES BLD QL: SLIGHT — SIGNIFICANT CHANGE UP
MANUAL SMEAR VERIFICATION: SIGNIFICANT CHANGE UP
MCHC RBC-ENTMCNC: 29.3 PG — SIGNIFICANT CHANGE UP (ref 27–34)
MCHC RBC-ENTMCNC: 32.9 GM/DL — SIGNIFICANT CHANGE UP (ref 32–36)
MCV RBC AUTO: 89.2 FL — SIGNIFICANT CHANGE UP (ref 80–100)
METHADONE UR-MCNC: NEGATIVE — SIGNIFICANT CHANGE UP
MONOCYTES # BLD AUTO: 2.18 K/UL — HIGH (ref 0–0.9)
MONOCYTES NFR BLD AUTO: 17.4 % — HIGH (ref 2–14)
NEUTROPHILS # BLD AUTO: 6.22 K/UL — SIGNIFICANT CHANGE UP (ref 1.8–7.4)
NEUTROPHILS NFR BLD AUTO: 49.6 % — SIGNIFICANT CHANGE UP (ref 43–77)
NITRITE UR-MCNC: NEGATIVE — SIGNIFICANT CHANGE UP
OPIATES UR-MCNC: NEGATIVE — SIGNIFICANT CHANGE UP
OVALOCYTES BLD QL SMEAR: SLIGHT — SIGNIFICANT CHANGE UP
PCP SPEC-MCNC: SIGNIFICANT CHANGE UP
PCP UR-MCNC: NEGATIVE — SIGNIFICANT CHANGE UP
PH UR: 6 — SIGNIFICANT CHANGE UP (ref 5–8)
PLAT MORPH BLD: NORMAL — SIGNIFICANT CHANGE UP
PLATELET # BLD AUTO: 594 K/UL — HIGH (ref 150–400)
POIKILOCYTOSIS BLD QL AUTO: SLIGHT — SIGNIFICANT CHANGE UP
POLYCHROMASIA BLD QL SMEAR: SLIGHT — SIGNIFICANT CHANGE UP
POTASSIUM SERPL-MCNC: 4.1 MMOL/L — SIGNIFICANT CHANGE UP (ref 3.5–5.3)
POTASSIUM SERPL-SCNC: 4.1 MMOL/L — SIGNIFICANT CHANGE UP (ref 3.5–5.3)
PROT SERPL-MCNC: 6.9 G/DL — SIGNIFICANT CHANGE UP (ref 6.6–8.7)
PROT UR-MCNC: 100
RBC # BLD: 3.14 M/UL — LOW (ref 4.2–5.8)
RBC # FLD: 15.9 % — HIGH (ref 10.3–14.5)
RBC BLD AUTO: ABNORMAL
RBC CASTS # UR COMP ASSIST: SIGNIFICANT CHANGE UP /HPF (ref 0–4)
RSV RNA NPH QL NAA+NON-PROBE: SIGNIFICANT CHANGE UP
SALICYLATES SERPL-MCNC: <0.6 MG/DL — LOW (ref 10–20)
SARS-COV-2 RNA SPEC QL NAA+PROBE: SIGNIFICANT CHANGE UP
SCHISTOCYTES BLD QL AUTO: SLIGHT — SIGNIFICANT CHANGE UP
SODIUM SERPL-SCNC: 141 MMOL/L — SIGNIFICANT CHANGE UP (ref 135–145)
SP GR SPEC: 1.01 — SIGNIFICANT CHANGE UP (ref 1.01–1.02)
THC UR QL: NEGATIVE — SIGNIFICANT CHANGE UP
UROBILINOGEN FLD QL: NEGATIVE MG/DL — SIGNIFICANT CHANGE UP
VARIANT LYMPHS # BLD: 1.7 % — SIGNIFICANT CHANGE UP (ref 0–6)
WBC # BLD: 12.54 K/UL — HIGH (ref 3.8–10.5)
WBC # FLD AUTO: 12.54 K/UL — HIGH (ref 3.8–10.5)
WBC UR QL: NEGATIVE /HPF — SIGNIFICANT CHANGE UP (ref 0–5)

## 2023-01-07 PROCEDURE — 82550 ASSAY OF CK (CPK): CPT

## 2023-01-07 PROCEDURE — 96374 THER/PROPH/DIAG INJ IV PUSH: CPT

## 2023-01-07 PROCEDURE — 81001 URINALYSIS AUTO W/SCOPE: CPT

## 2023-01-07 PROCEDURE — 99285 EMERGENCY DEPT VISIT HI MDM: CPT

## 2023-01-07 PROCEDURE — 99285 EMERGENCY DEPT VISIT HI MDM: CPT | Mod: 25

## 2023-01-07 PROCEDURE — 85025 COMPLETE CBC W/AUTO DIFF WBC: CPT

## 2023-01-07 PROCEDURE — 36415 COLL VENOUS BLD VENIPUNCTURE: CPT

## 2023-01-07 PROCEDURE — 80307 DRUG TEST PRSMV CHEM ANLYZR: CPT

## 2023-01-07 PROCEDURE — 87637 SARSCOV2&INF A&B&RSV AMP PRB: CPT

## 2023-01-07 PROCEDURE — 80053 COMPREHEN METABOLIC PANEL: CPT

## 2023-01-07 PROCEDURE — 82962 GLUCOSE BLOOD TEST: CPT

## 2023-01-07 RX ORDER — NALOXONE HYDROCHLORIDE 4 MG/.1ML
0.4 SPRAY NASAL ONCE
Refills: 0 | Status: COMPLETED | OUTPATIENT
Start: 2023-01-07 | End: 2023-01-07

## 2023-01-07 RX ADMIN — NALOXONE HYDROCHLORIDE 0.4 MILLIGRAM(S): 4 SPRAY NASAL at 21:08

## 2023-01-07 NOTE — ED ADULT NURSE NOTE - CAS ELECT INFOMATION PROVIDED
Patient ambulated off unit in steady gait, in no acute distress at time of discharge./DC instructions

## 2023-01-07 NOTE — ED PROVIDER NOTE - PROGRESS NOTE DETAILS
sleepy but arousable will watch pt poor historian and determine if safe dispo pt now fully arousable c/op soime chronic back pain but neuro intact. his fiance is at bedside at states she can watch pt at home tonight. return precautions given to pt and pts fiance at bedside.

## 2023-01-07 NOTE — ED PROVIDER NOTE - OBJECTIVE STATEMENT
Patient presents to ED describing may be a took too much gabapentin.  He denies any current headache chest pain shortness of breath or abdominal pain.  No suicidal homicidal ideation.  He is somewhat of a poor historian and only answers because basic questions.

## 2023-01-07 NOTE — ED ADULT NURSE NOTE - OBJECTIVE STATEMENT
Patient c/o lethargy s/p taking 3 doses of 100mg Gabapentin. Patient states he was started on Gabapentin yesterday for chronic back pain, now feeling generalized weakness to whole body. Patient A&Ox4, however slow to respond to RN questioning. IV placed, labs sent, medicated as per orders. MD at bedside.

## 2023-01-07 NOTE — ED PROVIDER NOTE - NSICDXPASTMEDICALHX_GEN_ALL_CORE_FT
PAST MEDICAL HISTORY:  Cervical radiculopathy     HLD (hyperlipidemia)     HTN (hypertension)     Low back pain     Lung nodule right lung    Osteoarthritis     RLS (restless legs syndrome)     Shoulder pain

## 2023-01-07 NOTE — ED PROVIDER NOTE - PATIENT PORTAL LINK FT
You can access the FollowMyHealth Patient Portal offered by [facility] by registering at the following website: [email or web address]. By joining [facility]’s FollowMyHealth portal, you will also be able to view your health information using other applications (apps) compatible with our system.

## 2023-01-07 NOTE — ED ADULT TRIAGE NOTE - CHIEF COMPLAINT QUOTE
BIBEMS c/o lethargy s/p taking 3 doses of 100mg Gabapentin. Patient states he was started on Gabapentin yesterday for chronic back pain, now feeling generalized weakness to whole body. Patient A&Ox4 at triage, however slow to respond to RN questioning. +ROM in BLE at triage.

## 2023-01-11 ENCOUNTER — APPOINTMENT (OUTPATIENT)
Dept: PAIN MANAGEMENT | Facility: CLINIC | Age: 78
End: 2023-01-11
Payer: MEDICARE

## 2023-01-11 VITALS — HEIGHT: 66 IN | WEIGHT: 140 LBS | BODY MASS INDEX: 22.5 KG/M2

## 2023-01-11 PROCEDURE — 99213 OFFICE O/P EST LOW 20 MIN: CPT

## 2023-01-11 NOTE — DISCUSSION/SUMMARY
[Medication Risks Reviewed] : Medication risks reviewed [de-identified] : Prescriptions renewed. [place]/obtained on chart [place]  rewiewed and appropriate. SOAPP-R completed on chart. The patient's medications are documented to the best of their ability. Quality of life and functional ability improved on medications. The patient is showing no aberrant behavior or evidence of diversion. The patient was advised not to use narcotic medication while operating an automobile or heavy machinery due to potential sedation or dizziness. The patient was educated to the risks associated with potential opioid dependence and addiction. Urine toxicology screens as per office protocol. Use of multimodal analgesia used prn. Follow up one month. \par

## 2023-01-11 NOTE — HISTORY OF PRESENT ILLNESS
[Lower back] : lower back [Gradual] : gradual [8] : 8 [3] : 3 [Dull/Aching] : dull/aching [Constant] : constant [Household chores] : household chores [Sleep] : sleep [Rest] : rest [Meds] : meds [Heat] : heat [Sitting] : sitting [Physical therapy] : physical therapy [Retired] : Work status: retired [FreeTextEntry1] : Chronic back pain managed by current medication regimen. ADL and functional ability improved on medications.  [] : This patient has had an injection before: no

## 2023-02-10 ENCOUNTER — APPOINTMENT (OUTPATIENT)
Dept: PAIN MANAGEMENT | Facility: CLINIC | Age: 78
End: 2023-02-10

## 2023-02-13 ENCOUNTER — APPOINTMENT (OUTPATIENT)
Dept: PAIN MANAGEMENT | Facility: CLINIC | Age: 78
End: 2023-02-13
Payer: MEDICARE

## 2023-02-13 VITALS — WEIGHT: 145 LBS | BODY MASS INDEX: 23.3 KG/M2 | HEIGHT: 66 IN

## 2023-02-13 DIAGNOSIS — G89.29 LOW BACK PAIN, UNSPECIFIED: ICD-10-CM

## 2023-02-13 DIAGNOSIS — M54.50 LOW BACK PAIN, UNSPECIFIED: ICD-10-CM

## 2023-02-13 PROCEDURE — 99213 OFFICE O/P EST LOW 20 MIN: CPT | Mod: 95

## 2023-02-13 NOTE — REASON FOR VISIT
[Follow-Up Visit] : a follow-up pain management visit [Home] : at home, [unfilled] , at the time of the visit. [Medical Office: ([facility])___] : at the medical office located in  [Patient] : the patient [Self] : self [FreeTextEntry2] : Back and joint pain

## 2023-02-13 NOTE — HISTORY OF PRESENT ILLNESS
[Lower back] : lower back [Gradual] : gradual [7] : 7 [Dull/Aching] : dull/aching [Constant] : constant [Household chores] : household chores [Work] : work [Sleep] : sleep [Rest] : rest [Meds] : meds [Heat] : heat [Sitting] : sitting [Standing] : standing [Walking] : walking [Bending forward] : bending forward [Retired] : Work status: retired [FreeTextEntry1] : Chronic back and joint pain. Pain medication effective for him. He manages a functional ADL.  [] : This patient has had an injection before: no [de-identified] : PROLONGED ACTIVITY  [de-identified] : MRI / CT SCAN

## 2023-02-13 NOTE — DISCUSSION/SUMMARY
[Medication Risks Reviewed] : Medication risks reviewed [de-identified] : Prescriptions renewed. [place]obtained [place]  reviewed and appropriate. SOAPP-R completed on chart. The patient's medications are documented to the best of their ability. Quality of life and functional ability improved on medications. The patient is showing no aberrant behavior or evidence of diversion. The patient was advised not to use narcotic medication while operating an automobile or heavy machinery due to potential sedation or dizziness. The patient was educated to the risks associated with potential opioid dependence and addiction. Urine toxicology screens as per office protocol. Use of multimodal analgesia used prn.\par Follow up one month.

## 2023-03-13 ENCOUNTER — APPOINTMENT (OUTPATIENT)
Dept: PAIN MANAGEMENT | Facility: CLINIC | Age: 78
End: 2023-03-13

## 2023-03-14 ENCOUNTER — APPOINTMENT (OUTPATIENT)
Dept: PAIN MANAGEMENT | Facility: CLINIC | Age: 78
End: 2023-03-14
Payer: MEDICARE

## 2023-03-14 VITALS — HEIGHT: 66 IN | BODY MASS INDEX: 22.5 KG/M2 | WEIGHT: 140 LBS

## 2023-03-14 DIAGNOSIS — M47.817 SPONDYLOSIS W/OUT MYELOPATHY OR RADICULOPATHY, LUMBOSACRAL REGION: ICD-10-CM

## 2023-03-14 PROCEDURE — 99213 OFFICE O/P EST LOW 20 MIN: CPT

## 2023-03-14 NOTE — HISTORY OF PRESENT ILLNESS
[Lower back] : lower back [Gradual] : gradual [7] : 7 [Dull/Aching] : dull/aching [Constant] : constant [Household chores] : household chores [Work] : work [Sleep] : sleep [Rest] : rest [Meds] : meds [Heat] : heat [Sitting] : sitting [Standing] : standing [Bending forward] : bending forward [Lying in bed] : lying in bed [Retired] : Work status: retired [FreeTextEntry1] : Chronic back pain managed with [employer]. He manages a functional ADL. [] : This patient has had an injection before: no [de-identified] : MRI / CT SCAN  [de-identified] : STRETCHING , HOME EXERCISE PROGRAM 2X/WK

## 2023-03-14 NOTE — DISCUSSION/SUMMARY
[Medication Risks Reviewed] : Medication risks reviewed [de-identified] : Prescriptions renewed. [place]obtained [place]  reviewed and appropriate. SOAPP-R completed on chart. The patient's medications are documented to the best of their ability. Quality of life and functional ability improved on medications. The patient is showing no aberrant behavior or evidence of diversion. The patient was advised not to use narcotic medication while operating an automobile or heavy machinery due to potential sedation or dizziness. The patient was educated to the risks associated with potential opioid dependence and addiction. Urine toxicology screens as per office protocol. Use of multimodal analgesia used prn.\par Follow up one month.

## 2023-04-12 ENCOUNTER — APPOINTMENT (OUTPATIENT)
Dept: PAIN MANAGEMENT | Facility: CLINIC | Age: 78
End: 2023-04-12
Payer: MEDICARE

## 2023-04-12 VITALS — HEIGHT: 66 IN | WEIGHT: 140 LBS | BODY MASS INDEX: 22.5 KG/M2

## 2023-04-12 PROCEDURE — 99213 OFFICE O/P EST LOW 20 MIN: CPT

## 2023-04-12 RX ORDER — HYDROCODONE BITARTRATE AND ACETAMINOPHEN 7.5; 325 MG/1; MG/1
TABLET ORAL
Refills: 0 | Status: DISCONTINUED | COMMUNITY
End: 2023-04-12

## 2023-04-12 RX ORDER — HYDROCODONE BITARTRATE AND ACETAMINOPHEN 10; 325 MG/1; MG/1
10-325 TABLET ORAL
Qty: 180 | Refills: 0 | Status: DISCONTINUED | COMMUNITY
Start: 2023-02-13 | End: 2023-04-12

## 2023-04-12 NOTE — HISTORY OF PRESENT ILLNESS
[Lower back] : lower back [Gradual] : gradual [7] : 7 [Dull/Aching] : dull/aching [Constant] : constant [Household chores] : household chores [Work] : work [Sleep] : sleep [Rest] : rest [Meds] : meds [Heat] : heat [Sitting] : sitting [Standing] : standing [Bending forward] : bending forward [Lying in bed] : lying in bed [Retired] : Work status: retired [] : This patient has had an injection before: no [de-identified] : MRI / CT SCAN  [de-identified] : STRETCHING , HOME EXERCISE PROGRAM 2X/WK \par

## 2023-05-05 ENCOUNTER — NON-APPOINTMENT (OUTPATIENT)
Age: 78
End: 2023-05-05

## 2023-05-05 DIAGNOSIS — Z87.448 PERSONAL HISTORY OF OTHER DISEASES OF URINARY SYSTEM: ICD-10-CM

## 2023-05-05 DIAGNOSIS — N18.9 CHRONIC KIDNEY DISEASE, UNSPECIFIED: ICD-10-CM

## 2023-05-05 RX ORDER — FAMOTIDINE 20 MG/1
20 TABLET, FILM COATED ORAL
Refills: 0 | Status: ACTIVE | COMMUNITY

## 2023-05-05 RX ORDER — ROSUVASTATIN CALCIUM 5 MG/1
5 TABLET, FILM COATED ORAL DAILY
Refills: 0 | Status: ACTIVE | COMMUNITY

## 2023-05-05 RX ORDER — CHLORHEXIDINE GLUCONATE 4 %
325 (65 FE) LIQUID (ML) TOPICAL DAILY
Refills: 0 | Status: ACTIVE | COMMUNITY

## 2023-05-05 RX ORDER — AMITRIPTYLINE HYDROCHLORIDE 50 MG/1
50 TABLET, FILM COATED ORAL
Refills: 0 | Status: ACTIVE | COMMUNITY

## 2023-05-05 RX ORDER — FOLIC ACID 1 MG/1
1 TABLET ORAL DAILY
Refills: 0 | Status: ACTIVE | COMMUNITY

## 2023-05-10 ENCOUNTER — APPOINTMENT (OUTPATIENT)
Dept: PAIN MANAGEMENT | Facility: CLINIC | Age: 78
End: 2023-05-10
Payer: MEDICARE

## 2023-05-10 VITALS — WEIGHT: 140 LBS | HEIGHT: 66 IN | BODY MASS INDEX: 22.5 KG/M2

## 2023-05-10 PROCEDURE — 99213 OFFICE O/P EST LOW 20 MIN: CPT

## 2023-05-10 RX ORDER — GABAPENTIN 300 MG/1
300 CAPSULE ORAL
Qty: 60 | Refills: 0 | Status: ACTIVE | COMMUNITY
Start: 2023-05-10 | End: 1900-01-01

## 2023-05-10 RX ORDER — HYDROCODONE BITARTRATE AND ACETAMINOPHEN 10; 325 MG/1; MG/1
10-325 TABLET ORAL
Qty: 180 | Refills: 0 | Status: DISCONTINUED | COMMUNITY
Start: 2023-03-14 | End: 2023-05-10

## 2023-05-10 NOTE — HISTORY OF PRESENT ILLNESS
[Lower back] : lower back [Gradual] : gradual [7] : 7 [Dull/Aching] : dull/aching [Constant] : constant [Household chores] : household chores [Work] : work [Sleep] : sleep [Rest] : rest [Meds] : meds [Heat] : heat [Sitting] : sitting [Standing] : standing [Bending forward] : bending forward [Lying in bed] : lying in bed [Retired] : Work status: retired [] : This patient has had an injection before: no [de-identified] : MRI / CT SCAN  [de-identified] : STRETCHING , HOME EXERCISE PROGRAM 2X/WK \par

## 2023-06-09 ENCOUNTER — APPOINTMENT (OUTPATIENT)
Dept: PAIN MANAGEMENT | Facility: CLINIC | Age: 78
End: 2023-06-09
Payer: MEDICARE

## 2023-06-09 PROCEDURE — 99213 OFFICE O/P EST LOW 20 MIN: CPT

## 2023-06-09 NOTE — HISTORY OF PRESENT ILLNESS
[Lower back] : lower back [7] : 7 [Dull/Aching] : dull/aching [Constant] : constant [Meds] : meds [Sitting] : sitting [] : This patient has had an injection before: no [de-identified] : MRI

## 2023-07-07 ENCOUNTER — APPOINTMENT (OUTPATIENT)
Dept: PAIN MANAGEMENT | Facility: CLINIC | Age: 78
End: 2023-07-07
Payer: MEDICARE

## 2023-07-07 NOTE — HISTORY OF PRESENT ILLNESS
[Lower back] : lower back [7] : 7 [Dull/Aching] : dull/aching [Constant] : constant [Meds] : meds [Sitting] : sitting [] : This patient has had an injection before: no [de-identified] : MRI

## 2023-07-10 ENCOUNTER — APPOINTMENT (OUTPATIENT)
Dept: PAIN MANAGEMENT | Facility: CLINIC | Age: 78
End: 2023-07-10
Payer: MEDICARE

## 2023-07-10 VITALS — BODY MASS INDEX: 22.5 KG/M2 | WEIGHT: 140 LBS | HEIGHT: 66 IN

## 2023-07-10 PROCEDURE — 99213 OFFICE O/P EST LOW 20 MIN: CPT

## 2023-07-10 NOTE — HISTORY OF PRESENT ILLNESS
[Lower back] : lower back [Dull/Aching] : dull/aching [Constant] : constant [Meds] : meds [Sitting] : sitting [Gradual] : gradual [8] : 8 [3] : 3 [Household chores] : household chores [Sleep] : sleep [Rest] : rest [Heat] : heat [Physical therapy] : physical therapy [Retired] : Work status: retired [] : This patient has had an injection before: no [de-identified] : MRI

## 2023-08-07 ENCOUNTER — APPOINTMENT (OUTPATIENT)
Dept: PAIN MANAGEMENT | Facility: CLINIC | Age: 78
End: 2023-08-07
Payer: MEDICARE

## 2023-08-07 VITALS — BODY MASS INDEX: 22.5 KG/M2 | WEIGHT: 140 LBS | HEIGHT: 66 IN

## 2023-08-07 PROCEDURE — 99213 OFFICE O/P EST LOW 20 MIN: CPT

## 2023-08-07 NOTE — HISTORY OF PRESENT ILLNESS
[Lower back] : lower back [Gradual] : gradual [8] : 8 [3] : 3 [Dull/Aching] : dull/aching [Constant] : constant [Household chores] : household chores [Sleep] : sleep [Rest] : rest [Meds] : meds [Heat] : heat [Sitting] : sitting [Physical therapy] : physical therapy [Retired] : Work status: retired [] : This patient has had an injection before: no [de-identified] : MRI

## 2023-08-31 NOTE — ASU PREOP CHECKLIST - ADVANCE DIRECTIVE ADDRESSED/READDRESSED
----- Message from Oriana Bruno sent at 8/31/2023  1:02 PM CDT -----  Regarding: Appointment canceled  Contact: [phone]  Appointment canceled for Oriana Bruno ([id number])  Visit Type: NEW PATIENT VISIT  Date        Time      Length    Provider                  Department  9/22/2023    9:20 AM  40 mins.  Selena Faust MD       [facility] [address]    Reason for Cancellation: Transportation issues   done

## 2023-09-06 ENCOUNTER — APPOINTMENT (OUTPATIENT)
Dept: PAIN MANAGEMENT | Facility: CLINIC | Age: 78
End: 2023-09-06
Payer: MEDICARE

## 2023-09-06 VITALS — WEIGHT: 140 LBS | HEIGHT: 66 IN | BODY MASS INDEX: 22.5 KG/M2

## 2023-09-06 PROCEDURE — 99213 OFFICE O/P EST LOW 20 MIN: CPT

## 2023-09-06 NOTE — HISTORY OF PRESENT ILLNESS
[Lower back] : lower back [Gradual] : gradual [8] : 8 [3] : 3 [Dull/Aching] : dull/aching [Constant] : constant [Household chores] : household chores [Sleep] : sleep [Rest] : rest [Meds] : meds [Heat] : heat [Sitting] : sitting [Physical therapy] : physical therapy [Retired] : Work status: retired [] : This patient has had an injection before: no [de-identified] : MRI

## 2023-10-01 ENCOUNTER — RX RENEWAL (OUTPATIENT)
Age: 78
End: 2023-10-01

## 2023-10-04 ENCOUNTER — APPOINTMENT (OUTPATIENT)
Dept: PAIN MANAGEMENT | Facility: CLINIC | Age: 78
End: 2023-10-04
Payer: MEDICARE

## 2023-10-04 VITALS — HEIGHT: 66 IN | WEIGHT: 140 LBS | BODY MASS INDEX: 22.5 KG/M2

## 2023-10-04 PROCEDURE — 99213 OFFICE O/P EST LOW 20 MIN: CPT

## 2023-10-30 ENCOUNTER — APPOINTMENT (OUTPATIENT)
Dept: PAIN MANAGEMENT | Facility: CLINIC | Age: 78
End: 2023-10-30
Payer: MEDICARE

## 2023-10-30 PROCEDURE — 99213 OFFICE O/P EST LOW 20 MIN: CPT

## 2023-11-08 ENCOUNTER — RX RENEWAL (OUTPATIENT)
Age: 78
End: 2023-11-08

## 2023-11-27 ENCOUNTER — APPOINTMENT (OUTPATIENT)
Dept: PAIN MANAGEMENT | Facility: CLINIC | Age: 78
End: 2023-11-27
Payer: MEDICARE

## 2023-11-27 VITALS — WEIGHT: 140 LBS | HEIGHT: 66 IN | BODY MASS INDEX: 22.5 KG/M2

## 2023-11-27 PROCEDURE — 99213 OFFICE O/P EST LOW 20 MIN: CPT

## 2023-12-16 ENCOUNTER — APPOINTMENT (OUTPATIENT)
Dept: PAIN MANAGEMENT | Facility: CLINIC | Age: 78
End: 2023-12-16
Payer: MEDICARE

## 2023-12-16 PROCEDURE — 99213 OFFICE O/P EST LOW 20 MIN: CPT | Mod: 95

## 2023-12-16 NOTE — HISTORY OF PRESENT ILLNESS
[Lower back] : lower back [Gradual] : gradual [8] : 8 [3] : 3 [Dull/Aching] : dull/aching [Constant] : constant [Household chores] : household chores [Sleep] : sleep [Rest] : rest [Meds] : meds [Heat] : heat [Sitting] : sitting [Physical therapy] : physical therapy [Retired] : Work status: retired [Home] : at home, [unfilled] , at the time of the visit. [Medical Office: ([facility])___] : at the medical office located in  [Verbal consent obtained from patient] : the patient, [unfilled] [FreeTextEntry1] : WHITNEY ABDULLAHI IS FOLLOWING UP FOR PAIN MED REFILL, THERE HAS NOT BEEN CHANGES IN PAIN SINCE LAST VISIT.   LAST UDS:10/4/2023   [] : This patient has had an injection before: no [de-identified] : MRI

## 2024-01-18 ENCOUNTER — APPOINTMENT (OUTPATIENT)
Dept: NUCLEAR MEDICINE | Facility: HOSPITAL | Age: 79
End: 2024-01-18
Payer: MEDICARE

## 2024-01-18 ENCOUNTER — OUTPATIENT (OUTPATIENT)
Dept: OUTPATIENT SERVICES | Facility: HOSPITAL | Age: 79
LOS: 1 days | End: 2024-01-18

## 2024-01-18 DIAGNOSIS — Z98.890 OTHER SPECIFIED POSTPROCEDURAL STATES: Chronic | ICD-10-CM

## 2024-01-18 DIAGNOSIS — R11.10 VOMITING, UNSPECIFIED: ICD-10-CM

## 2024-01-18 DIAGNOSIS — Z90.2 ACQUIRED ABSENCE OF LUNG [PART OF]: Chronic | ICD-10-CM

## 2024-01-18 PROCEDURE — 78264 GASTRIC EMPTYING IMG STUDY: CPT | Mod: 26,MH

## 2024-01-22 ENCOUNTER — APPOINTMENT (OUTPATIENT)
Dept: PAIN MANAGEMENT | Facility: CLINIC | Age: 79
End: 2024-01-22

## 2024-01-23 ENCOUNTER — APPOINTMENT (OUTPATIENT)
Dept: PAIN MANAGEMENT | Facility: CLINIC | Age: 79
End: 2024-01-23
Payer: MEDICARE

## 2024-01-23 VITALS — WEIGHT: 140 LBS | HEIGHT: 66 IN | BODY MASS INDEX: 22.5 KG/M2

## 2024-01-23 PROCEDURE — 99213 OFFICE O/P EST LOW 20 MIN: CPT

## 2024-01-23 NOTE — HISTORY OF PRESENT ILLNESS
[Lower back] : lower back [Gradual] : gradual [8] : 8 [3] : 3 [Dull/Aching] : dull/aching [Constant] : constant [Household chores] : household chores [Sleep] : sleep [Rest] : rest [Meds] : meds [Heat] : heat [Sitting] : sitting [Physical therapy] : physical therapy [Retired] : Work status: retired [FreeTextEntry1] : WHITNEY ABDULLAHI IS FOLLOWING UP FOR PAIN MED REFILL, THERE HAS NOT BEEN CHANGES IN PAIN SINCE LAST VISIT.   LAST UDS:10/4/2023   [] : This patient has had an injection before: no [de-identified] : MRI

## 2024-02-21 ENCOUNTER — APPOINTMENT (OUTPATIENT)
Dept: PAIN MANAGEMENT | Facility: CLINIC | Age: 79
End: 2024-02-21
Payer: MEDICARE

## 2024-02-21 PROCEDURE — 99213 OFFICE O/P EST LOW 20 MIN: CPT

## 2024-02-21 RX ORDER — NALOXONE HYDROCHLORIDE 4 MG/.1ML
4 SPRAY NASAL
Qty: 1 | Refills: 0 | Status: ACTIVE | COMMUNITY
Start: 2023-08-07 | End: 1900-01-01

## 2024-02-21 NOTE — HISTORY OF PRESENT ILLNESS
[Lower back] : lower back [6] : 6 [2] : 2 [Frequent] : frequent [Leisure] : leisure [Meds] : meds [Standing] : standing [Walking] : walking [Retired] : Work status: retired [] : This patient has had an injection before: no [FreeTextEntry7] : BOTH LEGS GET RESTLESS LEGS

## 2024-03-22 ENCOUNTER — APPOINTMENT (OUTPATIENT)
Dept: PAIN MANAGEMENT | Facility: CLINIC | Age: 79
End: 2024-03-22
Payer: MEDICARE

## 2024-03-22 DIAGNOSIS — M54.12 RADICULOPATHY, CERVICAL REGION: ICD-10-CM

## 2024-03-22 PROCEDURE — 99213 OFFICE O/P EST LOW 20 MIN: CPT

## 2024-03-22 NOTE — HISTORY OF PRESENT ILLNESS
[Lower back] : lower back [Gradual] : gradual [8] : 8 [3] : 3 [Dull/Aching] : dull/aching [Constant] : constant [Household chores] : household chores [Sleep] : sleep [Rest] : rest [Meds] : meds [Heat] : heat [Sitting] : sitting [Physical therapy] : physical therapy [Retired] : Work status: retired [FreeTextEntry1] : WHITNEY ABDULLAHI IS FOLLOWING UP FOR PAIN MED REFILL, THERE HAS NOT BEEN CHANGES IN PAIN SINCE LAST VISIT.   LAST UDS:02/21/2024   [] : no [de-identified] : MRI

## 2024-04-19 ENCOUNTER — APPOINTMENT (OUTPATIENT)
Dept: PAIN MANAGEMENT | Facility: CLINIC | Age: 79
End: 2024-04-19

## 2024-04-25 ENCOUNTER — APPOINTMENT (OUTPATIENT)
Dept: PAIN MANAGEMENT | Facility: CLINIC | Age: 79
End: 2024-04-25
Payer: MEDICARE

## 2024-04-25 PROCEDURE — 99213 OFFICE O/P EST LOW 20 MIN: CPT

## 2024-04-25 RX ORDER — LIDOCAINE 5% 700 MG/1
5 PATCH TOPICAL
Qty: 90 | Refills: 5 | Status: ACTIVE | COMMUNITY
Start: 2017-02-08 | End: 1900-01-01

## 2024-04-25 RX ORDER — ROPINIROLE 4 MG/1
4 TABLET, FILM COATED ORAL
Qty: 60 | Refills: 6 | Status: ACTIVE | COMMUNITY
Start: 2023-10-01 | End: 1900-01-01

## 2024-04-25 NOTE — HISTORY OF PRESENT ILLNESS
[Lower back] : lower back [Gradual] : gradual [8] : 8 [3] : 3 [Dull/Aching] : dull/aching [Constant] : constant [Household chores] : household chores [Sleep] : sleep [Rest] : rest [Meds] : meds [Heat] : heat [Sitting] : sitting [Physical therapy] : physical therapy [Retired] : Work status: retired [FreeTextEntry1] : WHITNEY ABDULLAHI IS FOLLOWING UP FOR PAIN MED REFILL, THERE HAS NOT BEEN CHANGES IN PAIN SINCE LAST VISIT.   LAST UDS:02/21/2024   [] : This patient has had an injection before: no [de-identified] : MRI

## 2024-05-24 ENCOUNTER — APPOINTMENT (OUTPATIENT)
Dept: PAIN MANAGEMENT | Facility: CLINIC | Age: 79
End: 2024-05-24
Payer: MEDICARE

## 2024-05-24 PROCEDURE — 99213 OFFICE O/P EST LOW 20 MIN: CPT

## 2024-05-24 NOTE — HISTORY OF PRESENT ILLNESS
[Lower back] : lower back [Gradual] : gradual [8] : 8 [3] : 3 [Dull/Aching] : dull/aching [Constant] : constant [Household chores] : household chores [Sleep] : sleep [Rest] : rest [Meds] : meds [Heat] : heat [Sitting] : sitting [Physical therapy] : physical therapy [Retired] : Work status: retired [] : This patient has had an injection before: no [de-identified] : MRI

## 2024-06-21 ENCOUNTER — APPOINTMENT (OUTPATIENT)
Dept: PAIN MANAGEMENT | Facility: CLINIC | Age: 79
End: 2024-06-21
Payer: MEDICARE

## 2024-06-21 DIAGNOSIS — M54.16 RADICULOPATHY, LUMBAR REGION: ICD-10-CM

## 2024-06-21 PROCEDURE — 99213 OFFICE O/P EST LOW 20 MIN: CPT

## 2024-06-21 RX ORDER — HYDROCODONE BITARTRATE AND ACETAMINOPHEN 10; 325 MG/1; MG/1
10-325 TABLET ORAL
Qty: 180 | Refills: 0 | Status: ACTIVE | COMMUNITY
Start: 2023-01-11 | End: 1900-01-01

## 2024-06-21 NOTE — HISTORY OF PRESENT ILLNESS
[Lower back] : lower back [Gradual] : gradual [8] : 8 [7] : 7 [Dull/Aching] : dull/aching [Constant] : constant [Household chores] : household chores [Sleep] : sleep [Rest] : rest [Meds] : meds [Heat] : heat [Sitting] : sitting [Physical therapy] : physical therapy [Retired] : Work status: retired [FreeTextEntry1] : WHITNEY ABDULLAHI  IS FOLLOWING UP FOR PAIN MED REFILL, THERE HAS BEEN NO CHANGES IN PAIN SINCE LAST VISIT.   LAST UDS: 6/21/24 [] : This patient has had an injection before: no [de-identified] : MRI

## 2024-07-19 ENCOUNTER — APPOINTMENT (OUTPATIENT)
Dept: PAIN MANAGEMENT | Facility: CLINIC | Age: 79
End: 2024-07-19
Payer: MEDICARE

## 2024-07-19 VITALS — BODY MASS INDEX: 20.89 KG/M2 | WEIGHT: 130 LBS | HEIGHT: 66 IN

## 2024-07-19 DIAGNOSIS — M54.16 RADICULOPATHY, LUMBAR REGION: ICD-10-CM

## 2024-07-19 PROCEDURE — 99213 OFFICE O/P EST LOW 20 MIN: CPT

## 2024-08-16 ENCOUNTER — APPOINTMENT (OUTPATIENT)
Dept: PAIN MANAGEMENT | Facility: CLINIC | Age: 79
End: 2024-08-16

## 2024-08-18 ENCOUNTER — INPATIENT (INPATIENT)
Facility: HOSPITAL | Age: 79
LOS: 3 days | Discharge: LEFT BEFORE DISCHARGE | End: 2024-08-22
Attending: STUDENT IN AN ORGANIZED HEALTH CARE EDUCATION/TRAINING PROGRAM | Admitting: STUDENT IN AN ORGANIZED HEALTH CARE EDUCATION/TRAINING PROGRAM
Payer: MEDICARE

## 2024-08-18 VITALS
HEIGHT: 65 IN | RESPIRATION RATE: 18 BRPM | HEART RATE: 89 BPM | OXYGEN SATURATION: 98 % | TEMPERATURE: 98 F | WEIGHT: 110.23 LBS | SYSTOLIC BLOOD PRESSURE: 116 MMHG | DIASTOLIC BLOOD PRESSURE: 66 MMHG

## 2024-08-18 DIAGNOSIS — Z90.2 ACQUIRED ABSENCE OF LUNG [PART OF]: Chronic | ICD-10-CM

## 2024-08-18 DIAGNOSIS — D64.9 ANEMIA, UNSPECIFIED: ICD-10-CM

## 2024-08-18 DIAGNOSIS — Z98.890 OTHER SPECIFIED POSTPROCEDURAL STATES: Chronic | ICD-10-CM

## 2024-08-18 LAB
ACANTHOCYTES BLD QL SMEAR: SLIGHT — SIGNIFICANT CHANGE UP
ADD ON TEST-SPECIMEN IN LAB: SIGNIFICANT CHANGE UP
ADD ON TEST-SPECIMEN IN LAB: SIGNIFICANT CHANGE UP
ALBUMIN SERPL ELPH-MCNC: 3.5 G/DL — SIGNIFICANT CHANGE UP (ref 3.3–5)
ALP SERPL-CCNC: 47 U/L — SIGNIFICANT CHANGE UP (ref 40–120)
ALT FLD-CCNC: 8 U/L — SIGNIFICANT CHANGE UP (ref 4–41)
ANION GAP SERPL CALC-SCNC: 14 MMOL/L — SIGNIFICANT CHANGE UP (ref 7–14)
ANISOCYTOSIS BLD QL: SLIGHT — SIGNIFICANT CHANGE UP
APPEARANCE UR: CLEAR — SIGNIFICANT CHANGE UP
APTT BLD: 28 SEC — SIGNIFICANT CHANGE UP (ref 24.5–35.6)
AST SERPL-CCNC: 17 U/L — SIGNIFICANT CHANGE UP (ref 4–40)
BACTERIA # UR AUTO: NEGATIVE /HPF — SIGNIFICANT CHANGE UP
BASOPHILS # BLD AUTO: 0 K/UL — SIGNIFICANT CHANGE UP (ref 0–0.2)
BASOPHILS NFR BLD AUTO: 0 % — SIGNIFICANT CHANGE UP (ref 0–2)
BILIRUB SERPL-MCNC: <0.2 MG/DL — SIGNIFICANT CHANGE UP (ref 0.2–1.2)
BILIRUB UR-MCNC: NEGATIVE — SIGNIFICANT CHANGE UP
BLD GP AB SCN SERPL QL: NEGATIVE — SIGNIFICANT CHANGE UP
BUN SERPL-MCNC: 79 MG/DL — HIGH (ref 7–23)
BURR CELLS BLD QL SMEAR: PRESENT — SIGNIFICANT CHANGE UP
CALCIUM SERPL-MCNC: 8.7 MG/DL — SIGNIFICANT CHANGE UP (ref 8.4–10.5)
CAST: 1 /LPF — SIGNIFICANT CHANGE UP (ref 0–4)
CHLORIDE SERPL-SCNC: 104 MMOL/L — SIGNIFICANT CHANGE UP (ref 98–107)
CO2 SERPL-SCNC: 16 MMOL/L — LOW (ref 22–31)
COLOR SPEC: YELLOW — SIGNIFICANT CHANGE UP
CREAT SERPL-MCNC: 2.5 MG/DL — HIGH (ref 0.5–1.3)
DIFF PNL FLD: ABNORMAL
EGFR: 26 ML/MIN/1.73M2 — LOW
EOSINOPHIL # BLD AUTO: 0.89 K/UL — HIGH (ref 0–0.5)
EOSINOPHIL NFR BLD AUTO: 4.4 % — SIGNIFICANT CHANGE UP (ref 0–6)
GAS PNL BLDV: SIGNIFICANT CHANGE UP
GIANT PLATELETS BLD QL SMEAR: PRESENT — SIGNIFICANT CHANGE UP
GLUCOSE SERPL-MCNC: 118 MG/DL — HIGH (ref 70–99)
GLUCOSE UR QL: NEGATIVE MG/DL — SIGNIFICANT CHANGE UP
HAPTOGLOB SERPL-MCNC: 278 MG/DL — HIGH (ref 34–200)
HCT VFR BLD CALC: 13.8 % — CRITICAL LOW (ref 39–50)
HCT VFR BLD CALC: 20.5 % — CRITICAL LOW (ref 39–50)
HGB BLD-MCNC: 4.4 G/DL — CRITICAL LOW (ref 13–17)
HGB BLD-MCNC: 7 G/DL — CRITICAL LOW (ref 13–17)
IANC: 16.96 K/UL — HIGH (ref 1.8–7.4)
INR BLD: 1.13 RATIO — SIGNIFICANT CHANGE UP (ref 0.85–1.18)
KETONES UR-MCNC: NEGATIVE MG/DL — SIGNIFICANT CHANGE UP
LDH SERPL L TO P-CCNC: 188 U/L — SIGNIFICANT CHANGE UP (ref 135–225)
LEUKOCYTE ESTERASE UR-ACNC: NEGATIVE — SIGNIFICANT CHANGE UP
LIDOCAIN IGE QN: 36 U/L — SIGNIFICANT CHANGE UP (ref 7–60)
LYMPHOCYTES # BLD AUTO: 1.6 K/UL — SIGNIFICANT CHANGE UP (ref 1–3.3)
LYMPHOCYTES # BLD AUTO: 7.9 % — LOW (ref 13–44)
MANUAL SMEAR VERIFICATION: SIGNIFICANT CHANGE UP
MCHC RBC-ENTMCNC: 28.6 PG — SIGNIFICANT CHANGE UP (ref 27–34)
MCHC RBC-ENTMCNC: 29.5 PG — SIGNIFICANT CHANGE UP (ref 27–34)
MCHC RBC-ENTMCNC: 31.9 GM/DL — LOW (ref 32–36)
MCHC RBC-ENTMCNC: 34.1 GM/DL — SIGNIFICANT CHANGE UP (ref 32–36)
MCV RBC AUTO: 86.5 FL — SIGNIFICANT CHANGE UP (ref 80–100)
MCV RBC AUTO: 89.6 FL — SIGNIFICANT CHANGE UP (ref 80–100)
MICROCYTES BLD QL: SLIGHT — SIGNIFICANT CHANGE UP
MONOCYTES # BLD AUTO: 1.05 K/UL — HIGH (ref 0–0.9)
MONOCYTES NFR BLD AUTO: 5.2 % — SIGNIFICANT CHANGE UP (ref 2–14)
MYELOCYTES NFR BLD: 0.9 % — HIGH (ref 0–0)
NEUTROPHILS # BLD AUTO: 16.53 K/UL — HIGH (ref 1.8–7.4)
NEUTROPHILS NFR BLD AUTO: 81.6 % — HIGH (ref 43–77)
NITRITE UR-MCNC: NEGATIVE — SIGNIFICANT CHANGE UP
NRBC # BLD: 0 /100 WBCS — SIGNIFICANT CHANGE UP (ref 0–0)
NRBC # FLD: 0 K/UL — SIGNIFICANT CHANGE UP (ref 0–0)
NT-PROBNP SERPL-SCNC: 911 PG/ML — HIGH
OB PNL STL: POSITIVE
PH UR: 6 — SIGNIFICANT CHANGE UP (ref 5–8)
PLAT MORPH BLD: NORMAL — SIGNIFICANT CHANGE UP
PLATELET # BLD AUTO: 264 K/UL — SIGNIFICANT CHANGE UP (ref 150–400)
PLATELET # BLD AUTO: 336 K/UL — SIGNIFICANT CHANGE UP (ref 150–400)
PLATELET COUNT - ESTIMATE: NORMAL — SIGNIFICANT CHANGE UP
POIKILOCYTOSIS BLD QL AUTO: SLIGHT — SIGNIFICANT CHANGE UP
POLYCHROMASIA BLD QL SMEAR: SLIGHT — SIGNIFICANT CHANGE UP
POTASSIUM SERPL-MCNC: 5.3 MMOL/L — SIGNIFICANT CHANGE UP (ref 3.5–5.3)
POTASSIUM SERPL-SCNC: 5.3 MMOL/L — SIGNIFICANT CHANGE UP (ref 3.5–5.3)
PROT SERPL-MCNC: 6.4 G/DL — SIGNIFICANT CHANGE UP (ref 6–8.3)
PROT UR-MCNC: 100 MG/DL
PROTHROM AB SERPL-ACNC: 12.7 SEC — SIGNIFICANT CHANGE UP (ref 9.5–13)
RBC # BLD: 1.54 M/UL — LOW (ref 4.2–5.8)
RBC # BLD: 2.37 M/UL — LOW (ref 4.2–5.8)
RBC # FLD: 15.9 % — HIGH (ref 10.3–14.5)
RBC # FLD: 16.2 % — HIGH (ref 10.3–14.5)
RBC BLD AUTO: ABNORMAL
RBC CASTS # UR COMP ASSIST: 0 /HPF — SIGNIFICANT CHANGE UP (ref 0–4)
RH IG SCN BLD-IMP: POSITIVE — SIGNIFICANT CHANGE UP
SCHISTOCYTES BLD QL AUTO: SLIGHT — SIGNIFICANT CHANGE UP
SODIUM SERPL-SCNC: 134 MMOL/L — LOW (ref 135–145)
SP GR SPEC: 1.01 — SIGNIFICANT CHANGE UP (ref 1–1.03)
SQUAMOUS # UR AUTO: 0 /HPF — SIGNIFICANT CHANGE UP (ref 0–5)
UROBILINOGEN FLD QL: 0.2 MG/DL — SIGNIFICANT CHANGE UP (ref 0.2–1)
WBC # BLD: 19.4 K/UL — HIGH (ref 3.8–10.5)
WBC # BLD: 20.26 K/UL — HIGH (ref 3.8–10.5)
WBC # FLD AUTO: 19.4 K/UL — HIGH (ref 3.8–10.5)
WBC # FLD AUTO: 20.26 K/UL — HIGH (ref 3.8–10.5)
WBC UR QL: 1 /HPF — SIGNIFICANT CHANGE UP (ref 0–5)

## 2024-08-18 PROCEDURE — 71046 X-RAY EXAM CHEST 2 VIEWS: CPT | Mod: 26

## 2024-08-18 PROCEDURE — 74176 CT ABD & PELVIS W/O CONTRAST: CPT | Mod: 26,MC

## 2024-08-18 PROCEDURE — 99291 CRITICAL CARE FIRST HOUR: CPT | Mod: GC

## 2024-08-18 PROCEDURE — 71250 CT THORAX DX C-: CPT | Mod: 26,MC

## 2024-08-18 PROCEDURE — 72131 CT LUMBAR SPINE W/O DYE: CPT | Mod: 26,MC

## 2024-08-18 RX ORDER — NALOXONE HCL 1 MG/ML
0.4 VIAL (ML) INJECTION ONCE
Refills: 0 | Status: DISCONTINUED | OUTPATIENT
Start: 2024-08-18 | End: 2024-08-22

## 2024-08-18 RX ORDER — LIDOCAINE/BENZALKONIUM/ALCOHOL
1 SOLUTION, NON-ORAL TOPICAL DAILY
Refills: 0 | Status: DISCONTINUED | OUTPATIENT
Start: 2024-08-18 | End: 2024-08-22

## 2024-08-18 RX ORDER — SODIUM CHLORIDE 9 MG/ML
1000 INJECTION INTRAMUSCULAR; INTRAVENOUS; SUBCUTANEOUS ONCE
Refills: 0 | Status: COMPLETED | OUTPATIENT
Start: 2024-08-18 | End: 2024-08-18

## 2024-08-18 RX ORDER — POLYETHYLENE GLYCOL 3350 17 G/17G
17 POWDER, FOR SOLUTION ORAL DAILY
Refills: 0 | Status: DISCONTINUED | OUTPATIENT
Start: 2024-08-18 | End: 2024-08-22

## 2024-08-18 RX ORDER — ACETAMINOPHEN 325 MG/1
750 TABLET ORAL ONCE
Refills: 0 | Status: COMPLETED | OUTPATIENT
Start: 2024-08-18 | End: 2024-08-18

## 2024-08-18 RX ORDER — ACETAMINOPHEN 325 MG/1
975 TABLET ORAL EVERY 8 HOURS
Refills: 0 | Status: COMPLETED | OUTPATIENT
Start: 2024-08-18 | End: 2024-08-20

## 2024-08-18 RX ORDER — OXYCODONE HYDROCHLORIDE 5 MG/1
10 TABLET ORAL EVERY 4 HOURS
Refills: 0 | Status: DISCONTINUED | OUTPATIENT
Start: 2024-08-18 | End: 2024-08-21

## 2024-08-18 RX ORDER — SENNA 187 MG
2 TABLET ORAL AT BEDTIME
Refills: 0 | Status: DISCONTINUED | OUTPATIENT
Start: 2024-08-18 | End: 2024-08-22

## 2024-08-18 RX ORDER — AZITHROMYCIN 500 MG/1
500 TABLET, FILM COATED ORAL ONCE
Refills: 0 | Status: COMPLETED | OUTPATIENT
Start: 2024-08-18 | End: 2024-08-18

## 2024-08-18 RX ORDER — OXYCODONE HYDROCHLORIDE 5 MG/1
5 TABLET ORAL EVERY 4 HOURS
Refills: 0 | Status: DISCONTINUED | OUTPATIENT
Start: 2024-08-18 | End: 2024-08-22

## 2024-08-18 RX ORDER — PANTOPRAZOLE SODIUM 40 MG
80 TABLET, DELAYED RELEASE (ENTERIC COATED) ORAL ONCE
Refills: 0 | Status: COMPLETED | OUTPATIENT
Start: 2024-08-18 | End: 2024-08-18

## 2024-08-18 RX ADMIN — ACETAMINOPHEN 975 MILLIGRAM(S): 325 TABLET ORAL at 22:00

## 2024-08-18 RX ADMIN — ACETAMINOPHEN 300 MILLIGRAM(S): 325 TABLET ORAL at 11:00

## 2024-08-18 RX ADMIN — OXYCODONE HYDROCHLORIDE 10 MILLIGRAM(S): 5 TABLET ORAL at 16:59

## 2024-08-18 RX ADMIN — Medication 2 MILLIGRAM(S): at 15:19

## 2024-08-18 RX ADMIN — Medication 1 PATCH: at 18:02

## 2024-08-18 RX ADMIN — Medication 2 TABLET(S): at 21:48

## 2024-08-18 RX ADMIN — OXYCODONE HYDROCHLORIDE 10 MILLIGRAM(S): 5 TABLET ORAL at 17:36

## 2024-08-18 RX ADMIN — ACETAMINOPHEN 975 MILLIGRAM(S): 325 TABLET ORAL at 21:48

## 2024-08-18 RX ADMIN — AZITHROMYCIN 255 MILLIGRAM(S): 500 TABLET, FILM COATED ORAL at 16:58

## 2024-08-18 RX ADMIN — Medication 1 PATCH: at 16:59

## 2024-08-18 RX ADMIN — SODIUM CHLORIDE 1000 MILLILITER(S): 9 INJECTION INTRAMUSCULAR; INTRAVENOUS; SUBCUTANEOUS at 11:00

## 2024-08-18 RX ADMIN — Medication 80 MILLIGRAM(S): at 15:19

## 2024-08-18 RX ADMIN — Medication 4 MILLIGRAM(S): at 12:29

## 2024-08-18 RX ADMIN — Medication 100 MILLIGRAM(S): at 15:19

## 2024-08-18 NOTE — ED PROVIDER NOTE - CLINICAL SUMMARY MEDICAL DECISION MAKING FREE TEXT BOX
78-year-old male with history of HTN, chronic back pain presents to the ED with abdominal pain, nausea, vomiting, decreased p.o. intake and weight loss over the past 2 years.  Patient says he has had previous episodes like this over the past 2 years.  Patient endorses soft loose stools which has since stopped taking Imodium yesterday.  Patient endorses feeling very weak with decreased appetite.  Patient has seen gastroenterology, negative colonoscopy.  Patient denies fevers, chest pain but does endorse shortness of breath.  Patient denies nausea at the moment.  Patient afebrile and HD stable.  DDx includes gastritis, gastroenteritis, intra-abdominal pathology, mass.  Dispo pending labs, imaging and reassessment.

## 2024-08-18 NOTE — ED PROVIDER NOTE - PHYSICAL EXAMINATION
Const: Awake, alert, no acute distress.  Well appearing.  Moving comfortably on stretcher.  HEENT: NC/AT.  Moist mucous membranes.  No pharyngeal erythema, no exudates.  Cardiac: Regular rate and regular rhythm. S1 S2 present.  Peripheral pulses 2+ and symmetric. No LE edema.  Resp: Speaking in full sentences. No evidence of respiratory distress.  Breath sounds clear to auscultation b/l. Normal chest excursion.   Abd: Non-distended, no overlying skin changes.  Soft, non-tender, no guarding, no rigidity, no rebound tenderness.  No palpable masses.  Normal bowel sounds in all 4 quadrants.  Back: Spine midline and non-tender. No CVAT.  Skin: Normal coloration.  No rashes, abrasions or lacerations.  Neuro: Awake, alert & oriented x 3.  Moves all extremities spontaneously.  No focal deficits.

## 2024-08-18 NOTE — ED PROVIDER NOTE - ATTENDING CONTRIBUTION TO CARE
77yo M ho htn, chronic back pain, pw worsening of his chronic back pain, vomiting and decreased PO. pt started vomiting 2 days ago and was assoc with loose stools, pt took immodium and the loose stools and vomiting has stopped but he has not eaten jonel few days and is feeling very weak,. he has been taking aleve and hydrocodone for his pain. nof alma or chills  also complains of large weight lsos over the last ocuple years, he has seen his primary and had labrowk egd and colonoscopy with no findngs   pt in NAD, has midline back tenderness mid spine  no le weakness or sensory deficit  abd nontneder  will check labs, pain control, CT abd and L spine 79yo M ho htn, chronic back pain, pw worsening of his chronic back pain, vomiting and decreased PO. pt started vomiting 2 days ago and was assoc with loose stools, pt took immodium and the loose stools and vomiting has stopped but he has not eaten jonel few days and is feeling very weak,. he has been taking aleve and hydrocodone for his pain. nof alma or chills  also complains of large weight lsos over the last ocuple years, he has seen his primary and had labrowk egd and colonoscopy with no findngs   pt in NAD, has midline back tenderness mid spine  no le weakness or sensory deficit  abd nontneder  will check labs, pain control, CT abd and L spine    Upon my evaluation, this patient had a high probability of imminent or life-threatening deterioration due to _anemia - hb 4.4, required 2 units prbc, likely GI bleed________, which required my direct attention, intervention, and personal management.  The patient has a  medical condition that impairs one or more vital organ systems.  Frequent personal assessment and adjustment of medical interventions was performed.      I have personally provided 60___ minutes of critical care time exclusive of time spent on separately billable procedures. Time includes review of laboratory data, radiology results, discussion with consultants, patient and family; monitoring for potential decompensation, as well as time spent retrieving data and reviewing the chart and documenting the visit. Interventions were performed as documented above.

## 2024-08-18 NOTE — ED ADULT TRIAGE NOTE - CHIEF COMPLAINT QUOTE
pt poor historian, c/o of diffuse abd pain with nausea and vomiting for 3 days, denies any chest pain  @ present, co of cough and mild sob as well

## 2024-08-18 NOTE — ED PROVIDER NOTE - CARE PLAN
1 Principal Discharge DX:	Abdominal pain   Principal Discharge DX:	Symptomatic anemia  Secondary Diagnosis:	Pneumonia

## 2024-08-18 NOTE — PATIENT PROFILE ADULT - FALL HARM RISK - HARM RISK INTERVENTIONS

## 2024-08-18 NOTE — ED ADULT NURSE REASSESSMENT NOTE - NS ED NURSE REASSESS COMMENT FT1
2nd type sent. blood bank called to alert of need for packed cells. pt medicated for c/o pain. remains alert,oriented x3. ,restless. will continue to monitor

## 2024-08-18 NOTE — PATIENT PROFILE ADULT - FLU SEASON?
DME request sent over to [facility] -[phone] received on 2-28-18
No

## 2024-08-18 NOTE — ED ADULT NURSE REASSESSMENT NOTE - NS ED NURSE REASSESS COMMENT FT1
Float RN: patient at baseline mental status, no acute distress noted. additional 20G IV placed in L forearm, labs sent. medication given per orders. primary RN Amanda aware

## 2024-08-19 DIAGNOSIS — I10 ESSENTIAL (PRIMARY) HYPERTENSION: ICD-10-CM

## 2024-08-19 DIAGNOSIS — N17.9 ACUTE KIDNEY FAILURE, UNSPECIFIED: ICD-10-CM

## 2024-08-19 DIAGNOSIS — Z29.9 ENCOUNTER FOR PROPHYLACTIC MEASURES, UNSPECIFIED: ICD-10-CM

## 2024-08-19 DIAGNOSIS — Z79.899 OTHER LONG TERM (CURRENT) DRUG THERAPY: ICD-10-CM

## 2024-08-19 DIAGNOSIS — D64.9 ANEMIA, UNSPECIFIED: ICD-10-CM

## 2024-08-19 DIAGNOSIS — E78.5 HYPERLIPIDEMIA, UNSPECIFIED: ICD-10-CM

## 2024-08-19 DIAGNOSIS — M54.9 DORSALGIA, UNSPECIFIED: ICD-10-CM

## 2024-08-19 DIAGNOSIS — J18.9 PNEUMONIA, UNSPECIFIED ORGANISM: ICD-10-CM

## 2024-08-19 LAB
ANION GAP SERPL CALC-SCNC: 13 MMOL/L — SIGNIFICANT CHANGE UP (ref 7–14)
BUN SERPL-MCNC: 57 MG/DL — HIGH (ref 7–23)
CALCIUM SERPL-MCNC: 8.5 MG/DL — SIGNIFICANT CHANGE UP (ref 8.4–10.5)
CHLORIDE SERPL-SCNC: 107 MMOL/L — SIGNIFICANT CHANGE UP (ref 98–107)
CO2 SERPL-SCNC: 15 MMOL/L — LOW (ref 22–31)
CREAT SERPL-MCNC: 2.07 MG/DL — HIGH (ref 0.5–1.3)
CULTURE RESULTS: SIGNIFICANT CHANGE UP
EGFR: 32 ML/MIN/1.73M2 — LOW
GLUCOSE SERPL-MCNC: 94 MG/DL — SIGNIFICANT CHANGE UP (ref 70–99)
HCT VFR BLD CALC: 21.3 % — LOW (ref 39–50)
HGB BLD-MCNC: 7.3 G/DL — LOW (ref 13–17)
MAGNESIUM SERPL-MCNC: 1.9 MG/DL — SIGNIFICANT CHANGE UP (ref 1.6–2.6)
MCHC RBC-ENTMCNC: 28.5 PG — SIGNIFICANT CHANGE UP (ref 27–34)
MCHC RBC-ENTMCNC: 34.3 GM/DL — SIGNIFICANT CHANGE UP (ref 32–36)
MCV RBC AUTO: 83.2 FL — SIGNIFICANT CHANGE UP (ref 80–100)
MRSA PCR RESULT.: SIGNIFICANT CHANGE UP
NRBC # BLD: 0 /100 WBCS — SIGNIFICANT CHANGE UP (ref 0–0)
NRBC # FLD: 0.02 K/UL — HIGH (ref 0–0)
PHOSPHATE SERPL-MCNC: 3.1 MG/DL — SIGNIFICANT CHANGE UP (ref 2.5–4.5)
PLATELET # BLD AUTO: 309 K/UL — SIGNIFICANT CHANGE UP (ref 150–400)
POTASSIUM SERPL-MCNC: 4.1 MMOL/L — SIGNIFICANT CHANGE UP (ref 3.5–5.3)
POTASSIUM SERPL-SCNC: 4.1 MMOL/L — SIGNIFICANT CHANGE UP (ref 3.5–5.3)
RBC # BLD: 2.56 M/UL — LOW (ref 4.2–5.8)
RBC # FLD: 16.6 % — HIGH (ref 10.3–14.5)
S AUREUS DNA NOSE QL NAA+PROBE: SIGNIFICANT CHANGE UP
SODIUM SERPL-SCNC: 135 MMOL/L — SIGNIFICANT CHANGE UP (ref 135–145)
SPECIMEN SOURCE: SIGNIFICANT CHANGE UP
WBC # BLD: 16.86 K/UL — HIGH (ref 3.8–10.5)
WBC # FLD AUTO: 16.86 K/UL — HIGH (ref 3.8–10.5)

## 2024-08-19 PROCEDURE — 99222 1ST HOSP IP/OBS MODERATE 55: CPT | Mod: GC

## 2024-08-19 PROCEDURE — 99223 1ST HOSP IP/OBS HIGH 75: CPT

## 2024-08-19 RX ORDER — HYDROCODONE BITARTRATE AND ACETAMINOPHEN 5; 325 MG/1; MG/1
1 TABLET ORAL
Refills: 0 | DISCHARGE

## 2024-08-19 RX ORDER — CHLORHEXIDINE GLUCONATE 40 MG/ML
1 SOLUTION TOPICAL DAILY
Refills: 0 | Status: DISCONTINUED | OUTPATIENT
Start: 2024-08-19 | End: 2024-08-22

## 2024-08-19 RX ORDER — LOSARTAN POTASSIUM 50 MG/1
25 TABLET ORAL DAILY
Refills: 0 | Status: DISCONTINUED | OUTPATIENT
Start: 2024-08-19 | End: 2024-08-19

## 2024-08-19 RX ORDER — AZITHROMYCIN 500 MG/1
500 TABLET, FILM COATED ORAL EVERY 24 HOURS
Refills: 0 | Status: COMPLETED | OUTPATIENT
Start: 2024-08-19 | End: 2024-08-20

## 2024-08-19 RX ORDER — ONDANSETRON 2 MG/ML
4 INJECTION, SOLUTION INTRAMUSCULAR; INTRAVENOUS EVERY 8 HOURS
Refills: 0 | Status: DISCONTINUED | OUTPATIENT
Start: 2024-08-19 | End: 2024-08-22

## 2024-08-19 RX ORDER — HEPARIN SODIUM,BOVINE 1000/ML
5000 VIAL (ML) INJECTION EVERY 8 HOURS
Refills: 0 | Status: DISCONTINUED | OUTPATIENT
Start: 2024-08-19 | End: 2024-08-22

## 2024-08-19 RX ORDER — PANTOPRAZOLE SODIUM 40 MG
40 TABLET, DELAYED RELEASE (ENTERIC COATED) ORAL
Refills: 0 | Status: DISCONTINUED | OUTPATIENT
Start: 2024-08-19 | End: 2024-08-22

## 2024-08-19 RX ORDER — MAGNESIUM, ALUMINUM HYDROXIDE 200-225/5
30 SUSPENSION, ORAL (FINAL DOSE FORM) ORAL EVERY 4 HOURS
Refills: 0 | Status: DISCONTINUED | OUTPATIENT
Start: 2024-08-19 | End: 2024-08-22

## 2024-08-19 RX ORDER — ROSUVASTATIN CALCIUM 10 MG/1
1 TABLET ORAL
Refills: 0 | DISCHARGE

## 2024-08-19 RX ADMIN — ACETAMINOPHEN 975 MILLIGRAM(S): 325 TABLET ORAL at 14:41

## 2024-08-19 RX ADMIN — ACETAMINOPHEN 975 MILLIGRAM(S): 325 TABLET ORAL at 05:18

## 2024-08-19 RX ADMIN — Medication 5000 UNIT(S): at 14:41

## 2024-08-19 RX ADMIN — Medication 100 MILLIGRAM(S): at 15:11

## 2024-08-19 RX ADMIN — Medication 1 PATCH: at 04:00

## 2024-08-19 RX ADMIN — Medication 1 PATCH: at 15:11

## 2024-08-19 RX ADMIN — Medication 40 MILLIGRAM(S): at 05:18

## 2024-08-19 RX ADMIN — ACETAMINOPHEN 975 MILLIGRAM(S): 325 TABLET ORAL at 14:51

## 2024-08-19 RX ADMIN — ACETAMINOPHEN 975 MILLIGRAM(S): 325 TABLET ORAL at 22:23

## 2024-08-19 RX ADMIN — AZITHROMYCIN 255 MILLIGRAM(S): 500 TABLET, FILM COATED ORAL at 18:25

## 2024-08-19 RX ADMIN — CHLORHEXIDINE GLUCONATE 1 APPLICATION(S): 40 SOLUTION TOPICAL at 14:44

## 2024-08-19 RX ADMIN — OXYCODONE HYDROCHLORIDE 10 MILLIGRAM(S): 5 TABLET ORAL at 21:59

## 2024-08-19 RX ADMIN — OXYCODONE HYDROCHLORIDE 10 MILLIGRAM(S): 5 TABLET ORAL at 22:59

## 2024-08-19 RX ADMIN — Medication 40 MILLIGRAM(S): at 22:01

## 2024-08-19 RX ADMIN — Medication 3 MILLIGRAM(S): at 22:30

## 2024-08-19 RX ADMIN — Medication 5000 UNIT(S): at 05:17

## 2024-08-19 RX ADMIN — Medication 1 PATCH: at 19:17

## 2024-08-19 RX ADMIN — OXYCODONE HYDROCHLORIDE 10 MILLIGRAM(S): 5 TABLET ORAL at 15:11

## 2024-08-19 RX ADMIN — ACETAMINOPHEN 975 MILLIGRAM(S): 325 TABLET ORAL at 06:00

## 2024-08-19 RX ADMIN — ACETAMINOPHEN 975 MILLIGRAM(S): 325 TABLET ORAL at 22:00

## 2024-08-19 RX ADMIN — OXYCODONE HYDROCHLORIDE 10 MILLIGRAM(S): 5 TABLET ORAL at 14:41

## 2024-08-19 RX ADMIN — Medication 5000 UNIT(S): at 22:01

## 2024-08-19 NOTE — DIETITIAN INITIAL EVALUATION ADULT - PROBLEM SELECTOR PLAN 1
p/w fatigue x days, with h/h 4.4/13.8 on admission, likely chronic in nature. HD stable  - s/p 2u pRBC with appropriate response   - no melena/hematochezia. +FOBT in ED. Last colonoscopy 9/2019 with polyps in descending/sigmoid colon, recommended to have repeat in 5 yrs, so due now. GI outpatients vs inpatient   - without evidence of hemolysis (elevated hapto, LDH/bili wnl)  - check iron studies, b12, folate, retic count   - PPI daily  - trend CBC daily, maintain active T&S, transfuse to maintain hgb >7

## 2024-08-19 NOTE — DIETITIAN INITIAL EVALUATION ADULT - ADD RECOMMEND
Monitor for (N/V), continue antiemetic PRN per medical team  Monitor weights, labs, BM's, skin integrity, p.o. intake.   Please monitor % PO intake on flowsheets  Honor food preferences as able to promote PO intake.

## 2024-08-19 NOTE — H&P ADULT - PROBLEM SELECTOR PLAN 2
h/o R lung adenocarcinoma s/p RUL wedge resection with leukocytosis and imaging suggestive of RLL pneumonia, however pt without any symptoms   - CT chest with RLL consolidation and multifocal tree-in-bud opacities suggestive of pneumonia with endobronchial spread vs impacted distal airways  - will empirically treat for CAP for now with CTX and azithromycin  - repeat CT chest outpatient in 6 weeks to ensure resolution  - f/u blood cxs

## 2024-08-19 NOTE — H&P ADULT - NSHPLABSRESULTS_GEN_ALL_CORE
.  LABS:                         7.0    19.40 )-----------( 264      ( 18 Aug 2024 22:47 )             20.5     08-18    134<L>  |  104  |  79<H>  ----------------------------<  118<H>  5.3   |  16<L>  |  2.50<H>    Ca    8.7      18 Aug 2024 10:50  Phos  3.5     08-18  Mg     2.20     08-18    TPro  6.4  /  Alb  3.5  /  TBili  <0.2  /  DBili  x   /  AST  17  /  ALT  8   /  AlkPhos  47  08-18    PT/INR - ( 18 Aug 2024 12:29 )   PT: 12.7 sec;   INR: 1.13 ratio         PTT - ( 18 Aug 2024 12:29 )  PTT:28.0 sec  Urinalysis Basic - ( 18 Aug 2024 10:50 )    Color: x / Appearance: x / SG: x / pH: x  Gluc: 118 mg/dL / Ketone: x  / Bili: x / Urobili: x   Blood: x / Protein: x / Nitrite: x   Leuk Esterase: x / RBC: x / WBC x   Sq Epi: x / Non Sq Epi: x / Bacteria: x    RADIOLOGY, EKG & ADDITIONAL TESTS: Reviewed.   < from: CT Chest No Cont (08.18.24 @ 13:37) >    CONTRAST/COMPLICATIONS:  IV Contrast: NONE  Oral Contrast: NONE  Complications: None reported at time of study completion    PROCEDURE:  CT of the Chest, Abdomen and Pelvis was performed.  Sagittal and coronal reformats were performed.    FINDINGS:  CHEST:  LUNGS AND LARGE AIRWAYS: No endobronchial lesions. Right upper lobe wedge   resection. Right lower lobe consolidation. Branching nodular opacities in   the lingula, right middle and lower lobes. Left lower lobe linear   atelectasis.  PLEURA: No pleural effusion.  VESSELS: Aortic calcifications. Coronary artery calcifications.  HEART: Heart size is normal. No pericardial effusion.  MEDIASTINUM AND PRISCILLA: No lymphadenopathy.  CHEST WALL AND LOWER NECK: Within normal limits.    ABDOMEN AND PELVIS:  Limited evaluation of the vasculature and solid organs in the absence of   intravenous contrast.  LIVER: Within normal limits.  BILE DUCTS: Normal caliber.  GALLBLADDER: Within normal limits.  SPLEEN: Atrophic.  PANCREAS: Within normal limits.  ADRENALS: Within normal limits.  KIDNEYS/URETERS: No hydronephrosis. Bilateral indeterminate   hyperattenuating renal lesions, measuring 1.6 cm in the right upper pole   (302-87) and 1.7 cm in the leftinterpolar region (302-90). Bilateral   renal cysts. Bilateral nonobstructing renal stones, measuring up to 3 mm   in the right upper pole.    BLADDER: Minimally distended.  REPRODUCTIVE ORGANS: Prostate is mildly enlarged.    BOWEL: No bowel obstruction. Appendix is normal.  PERITONEUM/RETROPERITONEUM: Within normal limits.  VESSELS: Atherosclerotic changes.  LYMPH NODES: No lymphadenopathy.  ABDOMINAL WALL: Tiny fat-containing umbilical hernia.  BONES: Degenerative changes. S-shaped scoliosis of the spine.    IMPRESSION:  Right lower lobe consolidation and multifocal tree-in-bud opacities.   Findings may reflect pneumonia with endobronchial spread of infection or   impacted distal airways. Recommend follow-up CT chest in 6 weeks to   ensureresolution.    Bilateral indeterminate renal lesions. Recommend further evaluation with   contrast-enhanced MR abdomen for more definitive characterization.        --- End of Report ---    < end of copied text >

## 2024-08-19 NOTE — H&P ADULT - NSHPPHYSICALEXAM_GEN_ALL_CORE
VITALS:   Vital Signs Last 24 Hrs  T(C): 37.1 (18 Aug 2024 22:30), Max: 37.2 (18 Aug 2024 17:26)  T(F): 98.7 (18 Aug 2024 22:30), Max: 99 (18 Aug 2024 17:26)  HR: 80 (18 Aug 2024 22:30) (74 - 89)  BP: 125/50 (18 Aug 2024 22:30) (113/52 - 133/60)  BP(mean): --  RR: 18 (18 Aug 2024 22:30) (16 - 18)  SpO2: 100% (18 Aug 2024 22:30) (98% - 100%)    Parameters below as of 18 Aug 2024 22:30  Patient On (Oxygen Delivery Method): room air    I&O's Summary    18 Aug 2024 07:01  -  19 Aug 2024 00:40  --------------------------------------------------------  IN: 300 mL / OUT: 475 mL / NET: -175 mL    CAPILLARY BLOOD GLUCOSE    POCT Blood Glucose.: 148 mg/dL (18 Aug 2024 09:33)    Physical Exam:  General: NAD, non-toxic appearing   HEENT: PERRL, EOMi, no scleral icterus  CV: RRR, normal S1 and S2, +2/6 systolic murmur   Lungs: normal respiratory effort on RA, CTAB  Abd: soft, nontender, nondistended  Ext: no edema, 2+ peripheral pulses   Pysch: AAOx3, appropriate affect   Neuro: grossly non-focal, moving all extremities spontaneously   Skin: no rashes or lesions

## 2024-08-19 NOTE — H&P ADULT - PROBLEM SELECTOR PLAN 1
p/w fatigue x days, with h/h 4.4/13.8 on admission, likely chronic in nature. HD stable  - s/p 2u pRBC with appropriate response   - no melena/hematochezia. +FOBT in ED. Last colonoscopy 9/2019 with polyps in descending/sigmoid colon, recommended to have repeat in 5 yrs, so due now. GI outpatients vs inpatient   - check iron studies, b12, folate   - trend CBC daily, maintain active T&S, transfuse to maintain hgb >7 p/w fatigue x days, with h/h 4.4/13.8 on admission, likely chronic in nature. HD stable  - s/p 2u pRBC with appropriate response   - no melena/hematochezia. +FOBT in ED. Last colonoscopy 9/2019 with polyps in descending/sigmoid colon, recommended to have repeat in 5 yrs, so due now. GI outpatients vs inpatient   - check iron studies, b12, folate   - PPI daily  - trend CBC daily, maintain active T&S, transfuse to maintain hgb >7 p/w fatigue x days, with h/h 4.4/13.8 on admission, likely chronic in nature. HD stable  - s/p 2u pRBC with appropriate response   - no melena/hematochezia. +FOBT in ED. Last colonoscopy 9/2019 with polyps in descending/sigmoid colon, recommended to have repeat in 5 yrs, so due now. GI outpatients vs inpatient   - without evidence of hemolysis (elevated hapto, LDH/bili wnl)  - check iron studies, b12, folate, retic count   - PPI daily  - trend CBC daily, maintain active T&S, transfuse to maintain hgb >7

## 2024-08-19 NOTE — H&P ADULT - HISTORY OF PRESENT ILLNESS
78M with PMH HTN, chronic pain back, and R lung adenocarcinoma s/p RUL wedge resection presenting for worsening fatigue over the past 3 days. Of note, pt is a poor historian, will answer specific questions with brief replies but not volunteering any further information. Denies having any abdominal pain, nausea, vomiting, or decreased PO intake. States that he came to the hospital because he was tired. Denies any chest pain, dizziness, syncope, fevers/chills, shortness of breath, cough, URI sxs, melena, hematochezia. Does not recall ever being told he's anemic. States he did receive blood transfusion in the remote past "during [place]", have not received any since. Had a colonoscopy <5 yrs ago but does not know results.    In ED, VS unremarkable, stable. Labs notable for severe anemia with h/h 4.4/13.8, leukocytosis to 17k, SCr 2.5 (NATIVIDAD on CKD vs progression of CKD), NAGMA, negative lactate   CT chest with RLL consolidation and multifocal tree-in-bud opacities suggestive of pneumonia with endobronchial spread vs impacted distal airyways   CT lumbar with moderate to severe L3-4 central canal stenosis, moderate to severe b/l L5-S1 foraminal stenoses  Recieved 2u pRBC, 1L NS, acetaminophen 750mg IV x1, CTX, azithro, lidocaine patch, morphine 2mg IVP x1, morphine 4mg IVP x1, PPI 80mg IV x1, oxy 10, and senna

## 2024-08-19 NOTE — DIETITIAN NUTRITION RISK NOTIFICATION - ADDITIONAL COMMENTS/DIETITIAN RECOMMENDATIONS
Please see Dietitian Initial Assessment for complete recommendations.  Debbie Garcia MS, RD, CDN, CNSC (pg #[phone])

## 2024-08-19 NOTE — H&P ADULT - PROBLEM SELECTOR PLAN 7
medrec was performed with patient, however, he was unable to produce list. Went through medications found on chart review one by one, however, he could not confirm dosages. states that he administers his own medications and is taking all of them

## 2024-08-19 NOTE — H&P ADULT - PROBLEM SELECTOR PLAN 3
SCr 2.5 on admission, NATIVIDAD on CKD vs progression of CKD, prior baseline 1.8 in 1/2023. With NAGMA, suspect due to NATIVIDAD. likely secondary to severe anemia   - hold home ARB  - trend, avoid nephrotoxic agents, renally dose meds

## 2024-08-19 NOTE — CONSULT NOTE ADULT - SUBJECTIVE AND OBJECTIVE BOX
HPI: 78M with PMH HTN, chronic pain back, and R lung adenocarcinoma s/p RUL wedge resection presenting for worsening fatigue over the past 3 days.     Patient reports that he has never been told that he has had anemia. Patient reports that he had a colonoscopy 1 year ago but reports that it was negative. Has been on Aleve prn but not frequently. Denies melena or hematochezia.     Denies any chest pain, dizziness, syncope, fevers/chills, shortness of breath, cough, URI sxs, melena, hematochezia.     Allergies:  No Known Allergies      Home Medications:    Hospital Medications:  acetaminophen     Tablet .. 975 milliGRAM(s) Oral every 8 hours  aluminum hydroxide/magnesium hydroxide/simethicone Suspension 30 milliLiter(s) Oral every 4 hours PRN  atorvastatin 40 milliGRAM(s) Oral at bedtime  azithromycin  IVPB 500 milliGRAM(s) IV Intermittent every 24 hours  bisacodyl 5 milliGRAM(s) Oral daily PRN  cefTRIAXone   IVPB 1000 milliGRAM(s) IV Intermittent every 24 hours  chlorhexidine 2% Cloths 1 Application(s) Topical daily  heparin   Injectable 5000 Unit(s) SubCutaneous every 8 hours  lidocaine   4% Patch 1 Patch Transdermal daily  melatonin 3 milliGRAM(s) Oral at bedtime PRN  naloxone Injectable 0.4 milliGRAM(s) IV Push once  ondansetron Injectable 4 milliGRAM(s) IV Push every 8 hours PRN  oxyCODONE    IR 5 milliGRAM(s) Oral every 4 hours PRN  oxyCODONE    IR 10 milliGRAM(s) Oral every 4 hours PRN  pantoprazole    Tablet 40 milliGRAM(s) Oral before breakfast  polyethylene glycol 3350 17 Gram(s) Oral daily  senna 2 Tablet(s) Oral at bedtime      PMHX/PSHX:  RLS (restless legs syndrome)    Lung nodule    HTN (hypertension)    HLD (hyperlipidemia)    Low back pain    Shoulder pain    Cervical radiculopathy    Osteoarthritis    Umbilical hernia    H/O colonoscopy    S/P lobectomy of lung        Family history:      Denies family history of colon cancer/polyps, stomach cancer/polyps, pancreatic cancer/masses, liver cancer/disease, ovarian cancer and endometrial cancer.    Social History:   Tob: Denies  EtOH: Denies  Illicit Drugs: Denies    ROS:     General:  No wt loss, fevers, chills, night sweats, fatigue  Eyes:  Good vision, no reported pain  ENT:  No sore throat, pain, runny nose, dysphagia  CV:  No pain, palpitations, hypo/hypertension  Pulm:  No dyspnea, cough, tachypnea, wheezing  GI:  see HPI  :  No pain, bleeding, incontinence, nocturia  Muscle:  No pain, weakness  Neuro:  No weakness, tingling, memory problems  Psych:  No fatigue, insomnia, mood problems, depression  Endocrine:  No polyuria, polydipsia, cold/heat intolerance  Heme:  No petechiae, ecchymosis, easy bruisability  Skin:  No rash, tattoos, scars, edema    PHYSICAL EXAM:     GENERAL:  No acute distress  HEENT:  NCAT, no scleral icterus   CHEST:  no respiratory distress  HEART:  Regular rate and rhythm  ABDOMEN:  Soft, non-tender, non-distended, no masses  EXTREMITIES: No edema  SKIN:  No rash/erythema/ecchymoses/petechiae/wounds/abscess/warm/dry  NEURO:  Alert and oriented x 3     Vital Signs:  Vital Signs Last 24 Hrs  T(C): 36.7 (19 Aug 2024 05:00), Max: 37.2 (18 Aug 2024 17:26)  T(F): 98 (19 Aug 2024 05:00), Max: 99 (18 Aug 2024 17:26)  HR: 81 (19 Aug 2024 05:00) (74 - 89)  BP: 134/71 (19 Aug 2024 05:00) (113/52 - 134/71)  BP(mean): --  RR: 18 (19 Aug 2024 05:00) (16 - 18)  SpO2: 97% (19 Aug 2024 05:00) (97% - 100%)    Parameters below as of 19 Aug 2024 05:00  Patient On (Oxygen Delivery Method): room air      Daily Height in cm: 165.1 (18 Aug 2024 09:30)    Daily     LABS:                        7.3    16.86 )-----------( 309      ( 19 Aug 2024 05:55 )             21.3     Mean Cell Volume: 83.2 fL (08-19-24 @ 05:55)    08-19    135  |  107  |  57<H>  ----------------------------<  94  4.1   |  15<L>  |  2.07<H>    Ca    8.5      19 Aug 2024 05:55  Phos  3.1     08-19  Mg     1.90     08-19    TPro  6.4  /  Alb  3.5  /  TBili  <0.2  /  DBili  x   /  AST  17  /  ALT  8   /  AlkPhos  47  08-18    LIVER FUNCTIONS - ( 18 Aug 2024 10:50 )  Alb: 3.5 g/dL / Pro: 6.4 g/dL / ALK PHOS: 47 U/L / ALT: 8 U/L / AST: 17 U/L / GGT: x           PT/INR - ( 18 Aug 2024 12:29 )   PT: 12.7 sec;   INR: 1.13 ratio         PTT - ( 18 Aug 2024 12:29 )  PTT:28.0 sec  Urinalysis Basic - ( 19 Aug 2024 05:55 )    Color: x / Appearance: x / SG: x / pH: x  Gluc: 94 mg/dL / Ketone: x  / Bili: x / Urobili: x   Blood: x / Protein: x / Nitrite: x   Leuk Esterase: x / RBC: x / WBC x   Sq Epi: x / Non Sq Epi: x / Bacteria: x      Amylase Serum--      Lipase serum36       Ammonia--                          7.3    16.86 )-----------( 309      ( 19 Aug 2024 05:55 )             21.3                         7.0    19.40 )-----------( 264      ( 18 Aug 2024 22:47 )             20.5                         4.4    20.26 )-----------( 336      ( 18 Aug 2024 10:50 )             13.8       Imaging:

## 2024-08-19 NOTE — CONSULT NOTE ADULT - ASSESSMENT
78M with PMH HTN, chronic pain back, and R lung adenocarcinoma s/p RUL wedge resection presenting for worsening fatigue over the past 3 days.     #normocytic anemia   Likely occult GIB given no overt GIB  - last colonoscopy reportedly within one year   - last colonoscopy in 2019 in EMR with polyps    - last EGD in 2019 with gastric stenosis with botox injection (CTAP without gastric distension)     Recommendations:   - will need medical optimization prior to endoscopic intervention   - plan on EGD/colonoscopy when medically optimized for anemia   - rest of care per primary team     All recommendations are tentative until note is attested by attending.     Christine Camacho, PGY-6  Gastroenterology/Hepatology Fellow  Available on [employer] Teams  [phone] ([facility] Short Range Pager)  [phone] ([facility] Long Range Pager)    On Weekends/Holidays (All day) and Weekdays after 5 PM to 8AM:  For non-urgent consults, please email [email or web address] or [email or web address]  For emergent consults, please contact on call GI team.  See Amion schedule ([facility]), [employer] paging system ([facility]), or call hospital  ([facility])      78M with PMH HTN, chronic pain back, and R lung adenocarcinoma s/p RUL wedge resection presenting for worsening fatigue over the past 3 days.     #normocytic anemia   Likely occult GIB given no overt GIB  - last colonoscopy reportedly within one year   - last colonoscopy in 2019 in EMR with polyps    - last EGD in 2019 with gastric stenosis with botox injection (CTAP without gastric distension)     Recommendations:   - will need medical optimization prior to endoscopic intervention   - plan on EGD/colonoscopy when medically optimized for anemia   - add on iron studies to labs prior to transfusion  - rest of care per primary team     All recommendations are tentative until note is attested by attending.     Christine Camacho, PGY-6  Gastroenterology/Hepatology Fellow  Available on [employer] Teams  [phone] ([facility] Short Range Pager)  [phone] ([facility] Long Range Pager)    On Weekends/Holidays (All day) and Weekdays after 5 PM to 8AM:  For non-urgent consults, please email [email or web address]  For emergent consults, please contact on call GI team.  See Amion schedule ([facility]), [email or web address] paging system ([facility]), or call hospital  ([facility])      78M with PMH HTN, chronic pain back, and R lung adenocarcinoma s/p RUL wedge resection presenting for worsening fatigue over the past 3 days.     #normocytic anemia   cannot  occult GIB given no overt GIB  - last colonoscopy reportedly within one year   - last colonoscopy in 2019 in EMR with polyps    - last EGD in 2019 with gastric stenosis with botox injection (CTAP without gastric distension)     Recommendations:   - will need medical optimization prior to endoscopic intervention (pneumonia, NATIVIDAD, anemia workup)  - plan on EGD/colonoscopy when medically optimized for anemia   - add on iron studies, B12 to labs prior to transfusion  - rest of care per primary team     All recommendations are tentative until note is attested by attending.     Christine Camacho, PGY-6  Gastroenterology/Hepatology Fellow  Available on [employer] Teams  [phone] ([facility] Short Range Pager)  [phone] ([facility] Long Range Pager)    On Weekends/Holidays (All day) and Weekdays after 5 PM to 8AM:  For non-urgent consults, please email [email or web address] or [email or web address]  For emergent consults, please contact on call GI team.  See Amion schedule ([facility]), [employer] paging system ([facility]), or call hospital  ([facility])

## 2024-08-19 NOTE — PROGRESS NOTE ADULT - SUBJECTIVE AND OBJECTIVE BOX
Patient is a 78y old  Male who presents with a chief complaint of     SUBJECTIVE / OVERNIGHT EVENTS: No acute events overnight. Pt has no new complaints.     ADDITIONAL REVIEW OF SYSTEMS:    MEDICATIONS  (STANDING):  acetaminophen     Tablet .. 975 milliGRAM(s) Oral every 8 hours  atorvastatin 40 milliGRAM(s) Oral at bedtime  azithromycin  IVPB 500 milliGRAM(s) IV Intermittent every 24 hours  cefTRIAXone   IVPB 1000 milliGRAM(s) IV Intermittent every 24 hours  chlorhexidine 2% Cloths 1 Application(s) Topical daily  heparin   Injectable 5000 Unit(s) SubCutaneous every 8 hours  lidocaine   4% Patch 1 Patch Transdermal daily  naloxone Injectable 0.4 milliGRAM(s) IV Push once  pantoprazole    Tablet 40 milliGRAM(s) Oral before breakfast  polyethylene glycol 3350 17 Gram(s) Oral daily  senna 2 Tablet(s) Oral at bedtime    MEDICATIONS  (PRN):  aluminum hydroxide/magnesium hydroxide/simethicone Suspension 30 milliLiter(s) Oral every 4 hours PRN Dyspepsia  bisacodyl 5 milliGRAM(s) Oral daily PRN Constipation  melatonin 3 milliGRAM(s) Oral at bedtime PRN Insomnia  ondansetron Injectable 4 milliGRAM(s) IV Push every 8 hours PRN Nausea and/or Vomiting  oxyCODONE    IR 5 milliGRAM(s) Oral every 4 hours PRN Moderate Pain (4 - 6)  oxyCODONE    IR 10 milliGRAM(s) Oral every 4 hours PRN Severe Pain (7 - 10)      CAPILLARY BLOOD GLUCOSE        I&O's Summary    18 Aug 2024 07:01  -  19 Aug 2024 07:00  --------------------------------------------------------  IN: 300 mL / OUT: 475 mL / NET: -175 mL        PHYSICAL EXAM:  Vital Signs Last 24 Hrs  T(C): 36.9 (19 Aug 2024 11:47), Max: 37.2 (18 Aug 2024 17:26)  T(F): 98.5 (19 Aug 2024 11:47), Max: 99 (18 Aug 2024 17:26)  HR: 90 (19 Aug 2024 11:47) (74 - 90)  BP: 129/75 (19 Aug 2024 11:47) (113/52 - 134/71)  BP(mean): --  RR: 18 (19 Aug 2024 11:47) (16 - 18)  SpO2: 98% (19 Aug 2024 11:47) (97% - 100%)    Parameters below as of 19 Aug 2024 11:47  Patient On (Oxygen Delivery Method): room air      General: NAD, non-toxic appearing   HEENT: PERRL, EOMi, no scleral icterus  CV: RRR, normal S1 and S2, +2/6 systolic murmur   Lungs: normal respiratory effort on RA, CTAB  Abd: soft, nontender, nondistended  Ext: no edema, 2+ peripheral pulses   Pysch: AAOx3, appropriate affect   Neuro: grossly non-focal, moving all extremities spontaneously   Skin: no rashes or lesions    LABS:                        7.3    16.86 )-----------( 309      ( 19 Aug 2024 05:55 )             21.3     08-19    135  |  107  |  57<H>  ----------------------------<  94  4.1   |  15<L>  |  2.07<H>    Ca    8.5      19 Aug 2024 05:55  Phos  3.1     08-19  Mg     1.90     08-19    TPro  6.4  /  Alb  3.5  /  TBili  <0.2  /  DBili  x   /  AST  17  /  ALT  8   /  AlkPhos  47  08-18    PT/INR - ( 18 Aug 2024 12:29 )   PT: 12.7 sec;   INR: 1.13 ratio         PTT - ( 18 Aug 2024 12:29 )  PTT:28.0 sec      Urinalysis Basic - ( 19 Aug 2024 05:55 )    Color: x / Appearance: x / SG: x / pH: x  Gluc: 94 mg/dL / Ketone: x  / Bili: x / Urobili: x   Blood: x / Protein: x / Nitrite: x   Leuk Esterase: x / RBC: x / WBC x   Sq Epi: x / Non Sq Epi: x / Bacteria: x        Culture - Urine (collected 18 Aug 2024 10:45)  Source: Clean Catch Clean Catch (Midstream)  Final Report (19 Aug 2024 12:16):    <10,000 CFU/mL Normal Urogenital Ora        RADIOLOGY & ADDITIONAL TESTS:  Results Reviewed:   Imaging Personally Reviewed:  Electrocardiogram Personally Reviewed:    COORDINATION OF CARE:  Care Discussed with Consultants/Other Providers [Y/N]:  Prior or Outpatient Records Reviewed [Y/N]:

## 2024-08-19 NOTE — PROGRESS NOTE ADULT - PROBLEM SELECTOR PLAN 1
p/w fatigue x days, with h/h 4.4/13.8 on admission, likely chronic in nature. HD stable  - s/p 2u pRBC with appropriate response   - no melena/hematochezia. +FOBT in ED. Last colonoscopy 9/2019 with polyps in descending/sigmoid colon, recommended to have repeat in 5 yrs, so due now  - without evidence of hemolysis (elevated hapto, LDH/bili wnl)  - check iron studies, b12, folate, retic count   - PPI daily  - trend CBC daily, maintain active T&S, transfuse to maintain hgb >7  - GI consulted. Plan for EGD once pt optimized

## 2024-08-19 NOTE — DIETITIAN INITIAL EVALUATION ADULT - REASON FOR ADMISSION
78M with PMH HTN, chronic pain back, and R lung adenocarcinoma s/p RUL wedge resection presenting for worsening fatigue over the past 3 days per chart

## 2024-08-19 NOTE — DIETITIAN INITIAL EVALUATION ADULT - PERTINENT LABORATORY DATA
08-19    135  |  107  |  57<H>  ----------------------------<  94  4.1   |  15<L>  |  2.07<H>    Ca    8.5      19 Aug 2024 05:55  Phos  3.1     08-19  Mg     1.90     08-19    TPro  6.4  /  Alb  3.5  /  TBili  <0.2  /  DBili  x   /  AST  17  /  ALT  8   /  AlkPhos  47  08-18

## 2024-08-19 NOTE — DIETITIAN INITIAL EVALUATION ADULT - OTHER INFO
Patient reported eating 50% of less at meals since admit due to dislike of meal, Dinner menu choices obtained and communicated to . Weekly menu provided to patient encouraged menu selection with  to promote intake. Pt reported he is still nauseous. Reported meds given for restless leg syndrome have helped encourage appetite. Amenable to take oral supplement. Believes last BM was prior to hospital admission. Pt is currently ordered for miralax and senna. Pt was acceptable to discussion of diet recs to promote intake (small frequent meals, high protein food sources, eating protein food sources first, oral supplements, recommendations to relieve nausea) additionally discussed iron and vitamin C rich foods. Handouts provided., Pt was attentive to discussion and asked questions.

## 2024-08-19 NOTE — DIETITIAN INITIAL EVALUATION ADULT - ORAL INTAKE PTA/DIET HISTORY
Patient reported poor intake x3 weeks due to poor appetite, unsure of what has been causing decreased intake. Reported usual intake of 2 meals. Pt however does endorsed drinking Ensures 2-3 bottles per day as of 2 months ago. Pt endorsed symptoms of Nausea, abdominal pain and vomiting (of note per H&P, pt denied having any abdominal pain, nausea, vomiting, or decreased PO intake). At encounter, pt denied chewing or swallowing concerns. No known food allergies. Pt endorsed concerns for weight loss stated he was 130-140 3 weeks ago and is currently down to 118 lbs. [facility] wt hx (kg) 63.5 (9/16/23), 63.5 (10/15/23), 63.5 (11/27/23), 63.5 (1/23/24)  59 (7/19/24). Current chart weight is noted 50 kg (8/18/24, writer checked bedscale to validate weight, observed 50.3 kg consistent with current chart weight); reflects 15% weightloss over 1 month time frame, vs 21.2% over 7 months time frame (as of 1/23/24).

## 2024-08-19 NOTE — H&P ADULT - PROBLEM SELECTOR PLAN 4
- CT lumbar with moderate to severe L3-4 central canal stenosis, moderate to severe b/l L5-S1 foraminal stenoses  - previously saw ortho-spine outpatient, currently being managed by ortho-pain management with vicodin 10-325mg q4h PRN (I-STOP#: [id number])  - pain management here with tylenol PRN mild pain, oxy 5 PRN moderate, oxy 10 PRN severe pain   - outpatient follow up

## 2024-08-19 NOTE — PROGRESS NOTE ADULT - PROBLEM SELECTOR PLAN 4
- CT lumbar with moderate to severe L3-4 central canal stenosis, moderate to severe b/l L5-S1 foraminal stenoses  - previously saw ortho-spine outpatient, currently being managed by ortho-pain management with vicodin 10-325mg q4h PRN (I-STOP#: [id number])  - pain management here with tylenol PRN mild pain, oxy 5 PRN moderate, oxy 10 PRN severe pain   - outpatient follow up

## 2024-08-19 NOTE — DIETITIAN INITIAL EVALUATION ADULT - PERTINENT MEDS FT
MEDICATIONS  (STANDING):  acetaminophen     Tablet .. 975 milliGRAM(s) Oral every 8 hours  atorvastatin 40 milliGRAM(s) Oral at bedtime  azithromycin  IVPB 500 milliGRAM(s) IV Intermittent every 24 hours  cefTRIAXone   IVPB 1000 milliGRAM(s) IV Intermittent every 24 hours  chlorhexidine 2% Cloths 1 Application(s) Topical daily  heparin   Injectable 5000 Unit(s) SubCutaneous every 8 hours  lidocaine   4% Patch 1 Patch Transdermal daily  naloxone Injectable 0.4 milliGRAM(s) IV Push once  pantoprazole    Tablet 40 milliGRAM(s) Oral before breakfast  polyethylene glycol 3350 17 Gram(s) Oral daily  senna 2 Tablet(s) Oral at bedtime    MEDICATIONS  (PRN):  aluminum hydroxide/magnesium hydroxide/simethicone Suspension 30 milliLiter(s) Oral every 4 hours PRN Dyspepsia  bisacodyl 5 milliGRAM(s) Oral daily PRN Constipation  melatonin 3 milliGRAM(s) Oral at bedtime PRN Insomnia  ondansetron Injectable 4 milliGRAM(s) IV Push every 8 hours PRN Nausea and/or Vomiting  oxyCODONE    IR 10 milliGRAM(s) Oral every 4 hours PRN Severe Pain (7 - 10)  oxyCODONE    IR 5 milliGRAM(s) Oral every 4 hours PRN Moderate Pain (4 - 6)

## 2024-08-19 NOTE — PROGRESS NOTE ADULT - ASSESSMENT
78M, poor historian, with PMH HTN, chronic pain back, R lung adenocarcinoma s/p RUL wedge resection, CKD, HLD, and restless leg syndrome presenting with fatigue x days, found to be severely anemic with hgb 4.4 s/p 2u pRBC. Imaging with RLL consolidation concerning for pneumonia, however, asymptomatic.

## 2024-08-19 NOTE — CONSULT NOTE ADULT - ATTENDING COMMENTS
Patient seen/examined.  Assessment/recommendations as noted.  Monitor serial hemoglobin/hematocrit.  Continue current regimen of pantoprazole.  Plan for colonoscopy/EGD when medically optimized.

## 2024-08-20 LAB
ANION GAP SERPL CALC-SCNC: 12 MMOL/L — SIGNIFICANT CHANGE UP (ref 7–14)
ANION GAP SERPL CALC-SCNC: 12 MMOL/L — SIGNIFICANT CHANGE UP (ref 7–14)
BASOPHILS # BLD AUTO: 0.06 K/UL — SIGNIFICANT CHANGE UP (ref 0–0.2)
BASOPHILS NFR BLD AUTO: 0.4 % — SIGNIFICANT CHANGE UP (ref 0–2)
BUN SERPL-MCNC: 36 MG/DL — HIGH (ref 7–23)
BUN SERPL-MCNC: 42 MG/DL — HIGH (ref 7–23)
CALCIUM SERPL-MCNC: 8.3 MG/DL — LOW (ref 8.4–10.5)
CALCIUM SERPL-MCNC: 8.3 MG/DL — LOW (ref 8.4–10.5)
CHLORIDE SERPL-SCNC: 111 MMOL/L — HIGH (ref 98–107)
CHLORIDE SERPL-SCNC: 112 MMOL/L — HIGH (ref 98–107)
CO2 SERPL-SCNC: 13 MMOL/L — LOW (ref 22–31)
CO2 SERPL-SCNC: 16 MMOL/L — LOW (ref 22–31)
CREAT ?TM UR-MCNC: 56 MG/DL — SIGNIFICANT CHANGE UP
CREAT SERPL-MCNC: 1.97 MG/DL — HIGH (ref 0.5–1.3)
CREAT SERPL-MCNC: 2.14 MG/DL — HIGH (ref 0.5–1.3)
EGFR: 31 ML/MIN/1.73M2 — LOW
EGFR: 34 ML/MIN/1.73M2 — LOW
EOSINOPHIL # BLD AUTO: 0.44 K/UL — SIGNIFICANT CHANGE UP (ref 0–0.5)
EOSINOPHIL NFR BLD AUTO: 2.8 % — SIGNIFICANT CHANGE UP (ref 0–6)
FERRITIN SERPL-MCNC: 231 NG/ML — SIGNIFICANT CHANGE UP (ref 30–400)
FOLATE SERPL-MCNC: 5.7 NG/ML — SIGNIFICANT CHANGE UP (ref 3.1–17.5)
GLUCOSE SERPL-MCNC: 115 MG/DL — HIGH (ref 70–99)
GLUCOSE SERPL-MCNC: 91 MG/DL — SIGNIFICANT CHANGE UP (ref 70–99)
HCT VFR BLD CALC: 20.5 % — CRITICAL LOW (ref 39–50)
HCT VFR BLD CALC: 26.1 % — LOW (ref 39–50)
HGB BLD-MCNC: 6.9 G/DL — CRITICAL LOW (ref 13–17)
HGB BLD-MCNC: 8.8 G/DL — LOW (ref 13–17)
IANC: 10.56 K/UL — HIGH (ref 1.8–7.4)
IMM GRANULOCYTES NFR BLD AUTO: 0.5 % — SIGNIFICANT CHANGE UP (ref 0–0.9)
IRON SATN MFR SERPL: 17 UG/DL — LOW (ref 45–165)
IRON SATN MFR SERPL: 9 % — LOW (ref 14–50)
LYMPHOCYTES # BLD AUTO: 13.9 % — SIGNIFICANT CHANGE UP (ref 13–44)
LYMPHOCYTES # BLD AUTO: 2.15 K/UL — SIGNIFICANT CHANGE UP (ref 1–3.3)
MAGNESIUM SERPL-MCNC: 2 MG/DL — SIGNIFICANT CHANGE UP (ref 1.6–2.6)
MCHC RBC-ENTMCNC: 28.6 PG — SIGNIFICANT CHANGE UP (ref 27–34)
MCHC RBC-ENTMCNC: 29.6 PG — SIGNIFICANT CHANGE UP (ref 27–34)
MCHC RBC-ENTMCNC: 33.7 GM/DL — SIGNIFICANT CHANGE UP (ref 32–36)
MCHC RBC-ENTMCNC: 33.7 GM/DL — SIGNIFICANT CHANGE UP (ref 32–36)
MCV RBC AUTO: 85.1 FL — SIGNIFICANT CHANGE UP (ref 80–100)
MCV RBC AUTO: 87.9 FL — SIGNIFICANT CHANGE UP (ref 80–100)
MONOCYTES # BLD AUTO: 2.21 K/UL — HIGH (ref 0–0.9)
MONOCYTES NFR BLD AUTO: 14.3 % — HIGH (ref 2–14)
NEUTROPHILS # BLD AUTO: 10.56 K/UL — HIGH (ref 1.8–7.4)
NEUTROPHILS NFR BLD AUTO: 68.1 % — SIGNIFICANT CHANGE UP (ref 43–77)
NRBC # BLD: 0 /100 WBCS — SIGNIFICANT CHANGE UP (ref 0–0)
NRBC # BLD: 0 /100 WBCS — SIGNIFICANT CHANGE UP (ref 0–0)
NRBC # FLD: 0 K/UL — SIGNIFICANT CHANGE UP (ref 0–0)
NRBC # FLD: 0 K/UL — SIGNIFICANT CHANGE UP (ref 0–0)
PHOSPHATE SERPL-MCNC: 3.4 MG/DL — SIGNIFICANT CHANGE UP (ref 2.5–4.5)
PLATELET # BLD AUTO: 353 K/UL — SIGNIFICANT CHANGE UP (ref 150–400)
PLATELET # BLD AUTO: 376 K/UL — SIGNIFICANT CHANGE UP (ref 150–400)
POTASSIUM SERPL-MCNC: 4 MMOL/L — SIGNIFICANT CHANGE UP (ref 3.5–5.3)
POTASSIUM SERPL-MCNC: 4.4 MMOL/L — SIGNIFICANT CHANGE UP (ref 3.5–5.3)
POTASSIUM SERPL-SCNC: 4 MMOL/L — SIGNIFICANT CHANGE UP (ref 3.5–5.3)
POTASSIUM SERPL-SCNC: 4.4 MMOL/L — SIGNIFICANT CHANGE UP (ref 3.5–5.3)
RBC # BLD: 2.41 M/UL — LOW (ref 4.2–5.8)
RBC # BLD: 2.41 M/UL — LOW (ref 4.2–5.8)
RBC # BLD: 2.97 M/UL — LOW (ref 4.2–5.8)
RBC # FLD: 16.5 % — HIGH (ref 10.3–14.5)
RBC # FLD: 17.4 % — HIGH (ref 10.3–14.5)
RETICS #: 56.4 K/UL — SIGNIFICANT CHANGE UP (ref 25–125)
RETICS/RBC NFR: 2.3 % — SIGNIFICANT CHANGE UP (ref 0.5–2.5)
SODIUM SERPL-SCNC: 137 MMOL/L — SIGNIFICANT CHANGE UP (ref 135–145)
SODIUM SERPL-SCNC: 139 MMOL/L — SIGNIFICANT CHANGE UP (ref 135–145)
SODIUM UR-SCNC: 108 MMOL/L — SIGNIFICANT CHANGE UP
TIBC SERPL-MCNC: 180 UG/DL — LOW (ref 220–430)
UIBC SERPL-MCNC: 163 UG/DL — SIGNIFICANT CHANGE UP (ref 110–370)
UUN UR-MCNC: 540 MG/DL — SIGNIFICANT CHANGE UP
VIT B12 SERPL-MCNC: 464 PG/ML — SIGNIFICANT CHANGE UP (ref 200–900)
WBC # BLD: 13.07 K/UL — HIGH (ref 3.8–10.5)
WBC # BLD: 15.49 K/UL — HIGH (ref 3.8–10.5)
WBC # FLD AUTO: 13.07 K/UL — HIGH (ref 3.8–10.5)
WBC # FLD AUTO: 15.49 K/UL — HIGH (ref 3.8–10.5)

## 2024-08-20 PROCEDURE — 99232 SBSQ HOSP IP/OBS MODERATE 35: CPT

## 2024-08-20 RX ADMIN — OXYCODONE HYDROCHLORIDE 10 MILLIGRAM(S): 5 TABLET ORAL at 17:02

## 2024-08-20 RX ADMIN — ACETAMINOPHEN 975 MILLIGRAM(S): 325 TABLET ORAL at 15:04

## 2024-08-20 RX ADMIN — CHLORHEXIDINE GLUCONATE 1 APPLICATION(S): 40 SOLUTION TOPICAL at 15:09

## 2024-08-20 RX ADMIN — Medication 5000 UNIT(S): at 06:19

## 2024-08-20 RX ADMIN — Medication 40 MILLIGRAM(S): at 21:29

## 2024-08-20 RX ADMIN — ACETAMINOPHEN 975 MILLIGRAM(S): 325 TABLET ORAL at 06:27

## 2024-08-20 RX ADMIN — Medication 40 MILLIGRAM(S): at 06:20

## 2024-08-20 RX ADMIN — OXYCODONE HYDROCHLORIDE 10 MILLIGRAM(S): 5 TABLET ORAL at 06:18

## 2024-08-20 RX ADMIN — Medication 2 TABLET(S): at 21:29

## 2024-08-20 RX ADMIN — ACETAMINOPHEN 975 MILLIGRAM(S): 325 TABLET ORAL at 16:00

## 2024-08-20 RX ADMIN — OXYCODONE HYDROCHLORIDE 10 MILLIGRAM(S): 5 TABLET ORAL at 16:32

## 2024-08-20 RX ADMIN — AZITHROMYCIN 255 MILLIGRAM(S): 500 TABLET, FILM COATED ORAL at 15:01

## 2024-08-20 RX ADMIN — Medication 100 MILLIGRAM(S): at 15:01

## 2024-08-20 RX ADMIN — ACETAMINOPHEN 975 MILLIGRAM(S): 325 TABLET ORAL at 06:18

## 2024-08-20 RX ADMIN — OXYCODONE HYDROCHLORIDE 10 MILLIGRAM(S): 5 TABLET ORAL at 07:18

## 2024-08-20 NOTE — PROVIDER CONTACT NOTE (CRITICAL VALUE NOTIFICATION) - BACKGROUND
Pt admitted for anemia with hgb 4.4. pt s/p 2 unit PRBCs
Pt admitted for anemia with hgb 4.4. pt s/p 2 unit PRBCs

## 2024-08-20 NOTE — PROGRESS NOTE ADULT - SUBJECTIVE AND OBJECTIVE BOX
Patient is a 78y old  Male who presents with a chief complaint of 78M with PMH HTN, chronic pain back, and R lung adenocarcinoma s/p RUL wedge resection presenting for worsening fatigue over the past 3 days per chart   (19 Aug 2024 17:12)      SUBJECTIVE / OVERNIGHT EVENTS: No acute events overnight. Pt has no new complaints     ADDITIONAL REVIEW OF SYSTEMS:    MEDICATIONS  (STANDING):  acetaminophen     Tablet .. 975 milliGRAM(s) Oral every 8 hours  atorvastatin 40 milliGRAM(s) Oral at bedtime  azithromycin  IVPB 500 milliGRAM(s) IV Intermittent every 24 hours  cefTRIAXone   IVPB 1000 milliGRAM(s) IV Intermittent every 24 hours  chlorhexidine 2% Cloths 1 Application(s) Topical daily  heparin   Injectable 5000 Unit(s) SubCutaneous every 8 hours  lidocaine   4% Patch 1 Patch Transdermal daily  naloxone Injectable 0.4 milliGRAM(s) IV Push once  pantoprazole    Tablet 40 milliGRAM(s) Oral before breakfast  polyethylene glycol 3350 17 Gram(s) Oral daily  senna 2 Tablet(s) Oral at bedtime    MEDICATIONS  (PRN):  aluminum hydroxide/magnesium hydroxide/simethicone Suspension 30 milliLiter(s) Oral every 4 hours PRN Dyspepsia  bisacodyl 5 milliGRAM(s) Oral daily PRN Constipation  melatonin 3 milliGRAM(s) Oral at bedtime PRN Insomnia  ondansetron Injectable 4 milliGRAM(s) IV Push every 8 hours PRN Nausea and/or Vomiting  oxyCODONE    IR 10 milliGRAM(s) Oral every 4 hours PRN Severe Pain (7 - 10)  oxyCODONE    IR 5 milliGRAM(s) Oral every 4 hours PRN Moderate Pain (4 - 6)      CAPILLARY BLOOD GLUCOSE        I&O's Summary    19 Aug 2024 07:01  -  20 Aug 2024 07:00  --------------------------------------------------------  IN: 600 mL / OUT: 0 mL / NET: 600 mL        PHYSICAL EXAM:  Vital Signs Last 24 Hrs  T(C): 36.8 (20 Aug 2024 10:28), Max: 37 (19 Aug 2024 21:00)  T(F): 98.3 (20 Aug 2024 10:28), Max: 98.6 (19 Aug 2024 21:00)  HR: 65 (20 Aug 2024 10:28) (65 - 80)  BP: 133/65 (20 Aug 2024 10:28) (111/57 - 133/65)  BP(mean): --  RR: 18 (20 Aug 2024 10:28) (18 - 18)  SpO2: 97% (20 Aug 2024 10:28) (97% - 100%)    Parameters below as of 20 Aug 2024 10:28  Patient On (Oxygen Delivery Method): room air      CONSTITUTIONAL: NAD  EYES: PERRLA; conjunctiva and sclera clear  ENMT: Moist oral mucosa, no pharyngeal injection or exudates; normal dentition  NECK: Supple, no palpable masses; no thyromegaly  RESPIRATORY: Normal respiratory effort; lungs are clear to auscultation bilaterally  CARDIOVASCULAR: Regular rate and rhythm, normal S1 and S2, no murmur/rub/gallop; No lower extremity edema; Peripheral pulses are 2+ bilaterally  ABDOMEN: Nontender to palpation, normoactive bowel sounds, no rebound/guarding; No hepatosplenomegaly  MUSCULOSKELETAL:  Normal gait; no clubbing or cyanosis of digits; no joint swelling or tenderness to palpation  PSYCH: A+O to person, place, and time; affect appropriate  NEUROLOGY: CN 2-12 are intact and symmetric; no gross sensory deficits   SKIN: No rashes; no palpable lesions    LABS:                        6.9    13.07 )-----------( 353      ( 20 Aug 2024 06:10 )             20.5     08-20    139  |  111<H>  |  42<H>  ----------------------------<  115<H>  4.0   |  16<L>  |  2.14<H>    Ca    8.3<L>      20 Aug 2024 06:10  Phos  3.4     08-20  Mg     2.00     08-20            Urinalysis Basic - ( 20 Aug 2024 06:10 )    Color: x / Appearance: x / SG: x / pH: x  Gluc: 115 mg/dL / Ketone: x  / Bili: x / Urobili: x   Blood: x / Protein: x / Nitrite: x   Leuk Esterase: x / RBC: x / WBC x   Sq Epi: x / Non Sq Epi: x / Bacteria: x        Culture - Blood (collected 18 Aug 2024 15:12)  Source: .Blood Blood  Preliminary Report (19 Aug 2024 19:01):    No growth at 24 hours    Culture - Blood (collected 18 Aug 2024 15:00)  Source: .Blood Blood  Preliminary Report (19 Aug 2024 19:02):    No growth at 24 hours    Culture - Urine (collected 18 Aug 2024 10:45)  Source: Clean Catch Clean Catch (Midstream)  Final Report (19 Aug 2024 12:16):    <10,000 CFU/mL Normal Urogenital Ora        RADIOLOGY & ADDITIONAL TESTS:  Results Reviewed:   Imaging Personally Reviewed:  Electrocardiogram Personally Reviewed:    COORDINATION OF CARE:  Care Discussed with Consultants/Other Providers [Y/N]:  Prior or Outpatient Records Reviewed [Y/N]:

## 2024-08-20 NOTE — CONSULT NOTE ADULT - ASSESSMENT
79 y/o man with     1) Anemia- Acute on chronic- Well known to Dr. Pearl nixon. h/o BHARGAV in past. Now s/p PRBC.   Gi following and for further w/u. h/h improved after prbc's/  moniotor cbc.  Gi follow up. would consider both egd and colonoscopy.   Ct scans without contrast not showing evidence of malig. He had an outpt PET scan in 3/2024.    2) Leukocytosis- likely reactive- has waxed and waned in the past. would monitor.    3) Onc/pulm- Pt has a h/o chronic lunng changes, unclear etiology.. he has been an active smoker. The lung lesion he had resected in the past, 2018,  was reportedly benign, as per Dr. nixon.  - pt has been actively followed in the past by Dr. CRICKET Jason. PET scan was done in 3/2024    4. Renal Pt with CKD. His renal function is near baseline  Had prior renal cysts.      5. 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  Had a bone marrow biopsy on 1-29-21 was a cellular marrow 50-60% with maturing trilineage hematopoesis and increased monotypic plasma cells 5-9% and 0.6% by flow.  Iron stores were absent had 4 doses of IV iron.    - Will check paraprotein levels     6. h/o   Occasional B12 shots if needed.  Pernicious anemia labs negative.  Last B12 levels on 8-2-23 was 338 will get B12 shot today on 2-6-24.      7.   Back pain follows with Dr Velasco.      8.   Weight loss has been an issue.  He has a prior history of lung cancer and was having night sweats.  PET/CT scan on 9-21-22 with mildly hypermetabolic patchy nodular opacities in the RLL of the lung associated with mild bronchiectasis and bronchial wall thickening was new since 2-14-20, s/p wedge resection of the RUL of the lung without change,  non obstructing bilateral renal calculi, otherwise negative scan.  On 7-12-22 had scans at [facility] with RLL opacities was treated for aspiration pneumonia.  CXR on 6-29-23 negative. CT chest on 12-6-23 with new LLL consolidation, ground glass opacities in the RLL, mucus plugging in posterior medial aspect both lower lobes.  Had CXR on 1-26-24 showed left posterior lung infiltrate without a pleural effusion, volume loss on the left compared to the right, right lung is normally aerated, aorta is elongated with calcium within the aortic knob recommend CXR in 2-3 weeks.   As outpt  He follows with Dr Jason and Dr Noble Thorpe    PET report: 3/2024  1. Scattered dense consolidation and groundglass opacities in the bilateral bases with mild associated FDG avidity, likely representing low-grade probably chronic infection. Low-grade hematologic malignancy is considered an unlikely possibility. Pulmonary consultation is recommended if clinically appropriate.  2. Of note a portion of the consolidation in the superior segment of the left lower lobe is masslike in appearance. This area should be monitored closely with CT to exclude an associated low-grade lung neoplasm.  3. Minimal FDG activity in the slightly size prominent mediastinal and bilateral hilar nodes. These nodes are probably inflammatory and would only be concerning for neoplasm if primary tumor was identified in the lungs.  4. The left kidney mid pole non-FDG avid hyperdense soft tissue nodule, incompletely characterized. Consider abdominal MRI for further characterization.  5. No PET or CT evidence of tumor in bone.

## 2024-08-20 NOTE — PROVIDER CONTACT NOTE (CRITICAL VALUE NOTIFICATION) - ACTION/TREATMENT ORDERED:
Per provider "I will look through pt chart to determine if pt needs any transfusion and follow up".
No further orders at this time, per provider "continue to monitor".

## 2024-08-21 LAB
ANION GAP SERPL CALC-SCNC: 12 MMOL/L — SIGNIFICANT CHANGE UP (ref 7–14)
BUN SERPL-MCNC: 33 MG/DL — HIGH (ref 7–23)
CALCIUM SERPL-MCNC: 8.6 MG/DL — SIGNIFICANT CHANGE UP (ref 8.4–10.5)
CHLORIDE SERPL-SCNC: 109 MMOL/L — HIGH (ref 98–107)
CO2 SERPL-SCNC: 18 MMOL/L — LOW (ref 22–31)
CREAT SERPL-MCNC: 2.01 MG/DL — HIGH (ref 0.5–1.3)
EGFR: 34 ML/MIN/1.73M2 — LOW
GAS PNL BLDV: SIGNIFICANT CHANGE UP
GLUCOSE SERPL-MCNC: 138 MG/DL — HIGH (ref 70–99)
HCT VFR BLD CALC: 24.8 % — LOW (ref 39–50)
HGB BLD-MCNC: 8.4 G/DL — LOW (ref 13–17)
IGA FLD-MCNC: 598 MG/DL — HIGH (ref 70–400)
IGM SERPL-MCNC: 20 MG/DL — LOW (ref 40–230)
MAGNESIUM SERPL-MCNC: 1.8 MG/DL — SIGNIFICANT CHANGE UP (ref 1.6–2.6)
MCHC RBC-ENTMCNC: 28.9 PG — SIGNIFICANT CHANGE UP (ref 27–34)
MCHC RBC-ENTMCNC: 33.9 GM/DL — SIGNIFICANT CHANGE UP (ref 32–36)
MCV RBC AUTO: 85.2 FL — SIGNIFICANT CHANGE UP (ref 80–100)
NRBC # BLD: 0 /100 WBCS — SIGNIFICANT CHANGE UP (ref 0–0)
NRBC # FLD: 0 K/UL — SIGNIFICANT CHANGE UP (ref 0–0)
PHOSPHATE SERPL-MCNC: 3.2 MG/DL — SIGNIFICANT CHANGE UP (ref 2.5–4.5)
PLATELET # BLD AUTO: 429 K/UL — HIGH (ref 150–400)
POTASSIUM SERPL-MCNC: 3.9 MMOL/L — SIGNIFICANT CHANGE UP (ref 3.5–5.3)
POTASSIUM SERPL-SCNC: 3.9 MMOL/L — SIGNIFICANT CHANGE UP (ref 3.5–5.3)
RBC # BLD: 2.91 M/UL — LOW (ref 4.2–5.8)
RBC # FLD: 16.8 % — HIGH (ref 10.3–14.5)
SODIUM SERPL-SCNC: 139 MMOL/L — SIGNIFICANT CHANGE UP (ref 135–145)
WBC # BLD: 14.28 K/UL — HIGH (ref 3.8–10.5)
WBC # FLD AUTO: 14.28 K/UL — HIGH (ref 3.8–10.5)

## 2024-08-21 PROCEDURE — 86334 IMMUNOFIX E-PHORESIS SERUM: CPT | Mod: 26

## 2024-08-21 PROCEDURE — 84165 PROTEIN E-PHORESIS SERUM: CPT | Mod: 26

## 2024-08-21 PROCEDURE — 99232 SBSQ HOSP IP/OBS MODERATE 35: CPT

## 2024-08-21 PROCEDURE — 99232 SBSQ HOSP IP/OBS MODERATE 35: CPT | Mod: GC

## 2024-08-21 RX ORDER — ROPINIROLE 2 MG/1
1 TABLET, FILM COATED, EXTENDED RELEASE ORAL
Refills: 0 | DISCHARGE

## 2024-08-21 RX ORDER — ROPINIROLE 2 MG/1
4 TABLET, FILM COATED, EXTENDED RELEASE ORAL
Refills: 0 | Status: DISCONTINUED | OUTPATIENT
Start: 2024-08-21 | End: 2024-08-22

## 2024-08-21 RX ORDER — POLYETHYLENE GLYCOL 3350, SODIUM SULFATE ANHYDROUS, SODIUM BICARBONATE, SODIUM CHLORIDE, POTASSIUM CHLORIDE 236; 22.74; 6.74; 5.86; 2.97 G/4L; G/4L; G/4L; G/4L; G/4L
4000 POWDER, FOR SOLUTION ORAL ONCE
Refills: 0 | Status: COMPLETED | OUTPATIENT
Start: 2024-08-21 | End: 2024-08-21

## 2024-08-21 RX ORDER — OXYCODONE HYDROCHLORIDE 5 MG/1
15 TABLET ORAL EVERY 6 HOURS
Refills: 0 | Status: DISCONTINUED | OUTPATIENT
Start: 2024-08-21 | End: 2024-08-22

## 2024-08-21 RX ADMIN — Medication 40 MILLIGRAM(S): at 06:04

## 2024-08-21 RX ADMIN — Medication 2 TABLET(S): at 22:09

## 2024-08-21 RX ADMIN — Medication 40 MILLIGRAM(S): at 22:09

## 2024-08-21 RX ADMIN — POLYETHYLENE GLYCOL 3350, SODIUM SULFATE ANHYDROUS, SODIUM BICARBONATE, SODIUM CHLORIDE, POTASSIUM CHLORIDE 4000 MILLILITER(S): 236; 22.74; 6.74; 5.86; 2.97 POWDER, FOR SOLUTION ORAL at 18:27

## 2024-08-21 RX ADMIN — Medication 100 MILLIGRAM(S): at 15:35

## 2024-08-21 RX ADMIN — Medication 3 MILLIGRAM(S): at 00:48

## 2024-08-21 RX ADMIN — POLYETHYLENE GLYCOL 3350 17 GRAM(S): 17 POWDER, FOR SOLUTION ORAL at 11:58

## 2024-08-21 RX ADMIN — OXYCODONE HYDROCHLORIDE 10 MILLIGRAM(S): 5 TABLET ORAL at 09:57

## 2024-08-21 RX ADMIN — Medication 5000 UNIT(S): at 22:09

## 2024-08-21 RX ADMIN — OXYCODONE HYDROCHLORIDE 10 MILLIGRAM(S): 5 TABLET ORAL at 10:57

## 2024-08-21 RX ADMIN — ROPINIROLE 4 MILLIGRAM(S): 2 TABLET, FILM COATED, EXTENDED RELEASE ORAL at 17:54

## 2024-08-21 RX ADMIN — OXYCODONE HYDROCHLORIDE 10 MILLIGRAM(S): 5 TABLET ORAL at 00:48

## 2024-08-21 RX ADMIN — CHLORHEXIDINE GLUCONATE 1 APPLICATION(S): 40 SOLUTION TOPICAL at 12:03

## 2024-08-21 NOTE — PROGRESS NOTE ADULT - ASSESSMENT
78-year-old male with anemia. 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 Has had iron def in past (iron studies here not reporting iron def.) He will be having a colonoscopy. He has been transfused and Hgb adequate today and no transfusion needed. Monitor. No hemolysis based upon prior labs.    leucocytosis overall trending down and he is getting iv antibiotics for possible pneumonia.    Mild thrombocytosis is likely reactive. Monitor.

## 2024-08-21 NOTE — PROGRESS NOTE ADULT - ASSESSMENT
78M with PMH HTN, chronic pain back, and R lung adenocarcinoma s/p RUL wedge resection presenting for worsening fatigue over the past 3 days. Also reported history of dark stool about 1 week prior to presentation.     GI procedures    - Colonoscopy in 2019 5mm polyps in the ascending colon and sigmoid colon.   - EGD in 2019 with gastritis with mild pyloric stenosis s/p botox injection (CTAP without gastric distension)     Assessment and plan       #Iron deficiency anemia  Differentials could be gastritis, pud, angioectasia, slow bleeding diverticuli r/o malignancy  #Right lobar pneumonia: responding to abx      Recommendations:     - Patient had another drop in hb to 6.9 yesterday. He is s/p 1 unit of blood. Post transfusion hb is 8.4. Denies bm.   - White count is responding to abx with subsequent decrease to 14 from 20. Patient remains afebrile and is on RA, hemodynamically stable. Cx neg.  - Will recommend a clear liquid diet today, then NPO at midnight.   - Bowel prep patient with 4L golytely starting at 5pm today.  - Please monitor hemoglobin level and transfuse with goal hb of >7.   - Replace electrolytes.  - Get preprocedure labs for tomorrow - cbc, bmp, mg, phos  - Plan for EGD/[place] tomorrow +/- biopsy  - Rest of care per primary team     All recommendations are tentative until note is attested by attending.       Heide Mena, PGY4  Gastroenterology and Hepatology  Available on TEAMS    For non urgent consult, please email [email or web address] (For [place]) or [email or web address] (For [place])  Long range page number [phone]. Short range [phone]

## 2024-08-21 NOTE — PROGRESS NOTE ADULT - ASSESSMENT
78M, poor historian, with PMH HTN, chronic pain back, R lung adenocarcinoma s/p RUL wedge resection, CKD, HLD, and restless leg syndrome presenting with fatigue x days, found to be severely anemic with hgb 4.4 s/p 2u pRBC. Imaging with RLL consolidation concerning for pneumonia, however, asymptomatic.  78M PMH HTN, chronic pain back, R lung adenocarcinoma s/p RUL wedge resection, active smoker, CKD, HLD, and restless leg syndrome, presenting with worsening fatigue x days, found to be severely anemic with hgb 4.4 s/p 2u pRBC. Imaging with RLL consolidation concerning for pneumonia, however, asymptomatic. Pt is being evaluated for possible causes of his anemia.

## 2024-08-21 NOTE — PROGRESS NOTE ADULT - PROBLEM SELECTOR PLAN 7
medrec was performed with patient, however, he was unable to produce list. Went through medications found on chart review one by one, however, he could not confirm dosages. states that he administers his own medications and is taking all of them Diet: DASH  DVT ppx: heparin SQ  Dispo: pending clinical course

## 2024-08-21 NOTE — PROGRESS NOTE ADULT - PROBLEM SELECTOR PLAN 1
p/w fatigue x days, with h/h 4.4/13.8 on admission, likely chronic in nature. HD stable  - s/p 2u pRBC with appropriate response   - no melena/hematochezia. +FOBT in ED. Last colonoscopy 9/2019 with polyps in descending/sigmoid colon, recommended to have repeat in 5 yrs, so due now  - without evidence of hemolysis (elevated hapto, LDH/bili wnl)  - check iron studies, b12, folate, retic count   - PPI daily  - trend CBC daily, maintain active T&S, transfuse to maintain hgb >7  - GI consulted. Plan for EGD once pt optimized p/w fatigue x days, with Hgb 4.4 on admission, likely chronic in nature. HDS  - likely due to MGUS vs GI blood loss  - s/p 2u pRBC with appropriate response   - no melena/hematochezia. +FOBT in ED. 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 consider skeletal survey

## 2024-08-21 NOTE — PROGRESS NOTE ADULT - PROBLEM SELECTOR PLAN 4
- CT lumbar with moderate to severe L3-4 central canal stenosis, moderate to severe b/l L5-S1 foraminal stenoses  - previously saw ortho-spine outpatient, currently being managed by ortho-pain management with vicodin 10-325mg q4h PRN (I-STOP#: [id number])  - pain management here with tylenol PRN mild pain, oxy 5 PRN moderate, oxy 10 PRN severe pain   - outpatient follow up - worsening  - CT lumbar with moderate to severe central canal stenosis  - previously saw ortho-spine outpatient, currently being managed by ortho-pain management with vicodin 10-325mg q4h PRN (I-STOP#: [id number])  - c/w pain management  with tylenol PRN mild pain, oxy 5 PRN moderate, oxy 15PRN severe pain q4h  - f/u pain management  - f/u MRI lumbar spine, r/o cauda equina 2/2 cord compression  - outpatient follow up

## 2024-08-21 NOTE — PROGRESS NOTE ADULT - SUBJECTIVE AND OBJECTIVE BOX
Patient is a 78y old  Male who presents with a chief complaint of 78M with PMH HTN, chronic pain back, and R lung adenocarcinoma s/p RUL wedge resection presenting for worsening fatigue over the past 3 days per chart (19 Aug 2024 17:12)    Aside for back pain and leg cramps he feels okay. No HA or dizziness. No fever. No CP or SOB. No N/V/D or abd pain. No bleeding    Medication:   aluminum hydroxide/magnesium hydroxide/simethicone Suspension 30 milliLiter(s) Oral every 4 hours PRN  atorvastatin 40 milliGRAM(s) Oral at bedtime  bisacodyl 5 milliGRAM(s) Oral daily PRN  cefTRIAXone   IVPB 1000 milliGRAM(s) IV Intermittent every 24 hours  chlorhexidine 2% Cloths 1 Application(s) Topical daily  heparin   Injectable 5000 Unit(s) SubCutaneous every 8 hours  lidocaine   4% Patch 1 Patch Transdermal daily  melatonin 3 milliGRAM(s) Oral at bedtime PRN  naloxone Injectable 0.4 milliGRAM(s) IV Push once  ondansetron Injectable 4 milliGRAM(s) IV Push every 8 hours PRN  oxyCODONE    IR 10 milliGRAM(s) Oral every 4 hours PRN  oxyCODONE    IR 5 milliGRAM(s) Oral every 4 hours PRN  pantoprazole    Tablet 40 milliGRAM(s) Oral before breakfast  polyethylene glycol 3350 17 Gram(s) Oral daily  senna 2 Tablet(s) Oral at bedtime      Physical exam    T(C): 36.8 (08-21-24 @ 06:06), Max: 37.1 (08-20-24 @ 21:25)  HR: 80 (08-21-24 @ 06:06) (72 - 81)  BP: 143/72 (08-21-24 @ 06:06) (137/70 - 146/72)  RR: 16 (08-21-24 @ 06:06) (16 - 18)  SpO2: 99% (08-21-24 @ 06:06) (97% - 99%)  Wt(kg): --    alert NAD  EOMI anicteric sclera  Cv s1 S2 RRR  Lungs clear B/L  abd soft NT ND +BS  No LE edema or tenderness    Labs                          8.4    14.28 )-----------( 429      ( 21 Aug 2024 05:30 )             24.8       08-21    139  |  109<H>  |  33<H>  ----------------------------<  138<H>  3.9   |  18<L>  |  2.01<H>    Ca    8.6      21 Aug 2024 05:30  Phos  3.2     08-21  Mg     1.80     08-21            [id number]

## 2024-08-21 NOTE — PROGRESS NOTE ADULT - SUBJECTIVE AND OBJECTIVE BOX
PROGRESS NOTE:   Authored by Dr. Marilee Melendez  Patient is a 78y old  Male who presents with a chief complaint of 78M with PMH HTN, chronic pain back, and R lung adenocarcinoma s/p RUL wedge resection presenting for worsening fatigue over the past 3 days per chart   (19 Aug 2024 17:12)        OVERNIGHT EVENTS: No acute overnight events.    SUBJECTIVE: Patient was seen and examined at bedside this morning.   Denies any nausea/vomiting/diarrhea, headache, shortness of breath, abdominal pain or chest pain/palpitations.   Patient responding appropriately to questions and able to make needs known.   Vital signs/imaging/labs/telemetry events reviewed.   Pt reports back pain, insomnia, constipation , no BM over 1 week.  Tolerating PO although dec appetite.       All other ROS neg except as above       MEDICATIONS  (STANDING):  atorvastatin 40 milliGRAM(s) Oral at bedtime  cefTRIAXone   IVPB 1000 milliGRAM(s) IV Intermittent every 24 hours  chlorhexidine 2% Cloths 1 Application(s) Topical daily  heparin   Injectable 5000 Unit(s) SubCutaneous every 8 hours  lidocaine   4% Patch 1 Patch Transdermal daily  naloxone Injectable 0.4 milliGRAM(s) IV Push once  pantoprazole    Tablet 40 milliGRAM(s) Oral before breakfast  polyethylene glycol 3350 17 Gram(s) Oral daily  polyethylene glycol/electrolyte Solution. 4000 milliLiter(s) Oral once  rOPINIRole 4 milliGRAM(s) Oral two times a day  senna 2 Tablet(s) Oral at bedtime    MEDICATIONS  (PRN):  aluminum hydroxide/magnesium hydroxide/simethicone Suspension 30 milliLiter(s) Oral every 4 hours PRN Dyspepsia  bisacodyl 5 milliGRAM(s) Oral daily PRN Constipation  melatonin 3 milliGRAM(s) Oral at bedtime PRN Insomnia  ondansetron Injectable 4 milliGRAM(s) IV Push every 8 hours PRN Nausea and/or Vomiting  oxyCODONE    IR 10 milliGRAM(s) Oral every 4 hours PRN Severe Pain (7 - 10)  oxyCODONE    IR 5 milliGRAM(s) Oral every 4 hours PRN Moderate Pain (4 - 6)      CAPILLARY BLOOD GLUCOSE        I&O's Summary      PHYSICAL EXAM:  Vital Signs Last 24 Hrs  T(C): 36.7 (21 Aug 2024 17:00), Max: 37.1 (20 Aug 2024 21:25)  T(F): 98.1 (21 Aug 2024 17:00), Max: 98.7 (20 Aug 2024 21:25)  HR: 78 (21 Aug 2024 17:00) (70 - 81)  BP: 142/71 (21 Aug 2024 17:00) (137/68 - 146/72)  BP(mean): --  RR: 17 (21 Aug 2024 17:00) (16 - 18)  SpO2: 100% (21 Aug 2024 17:00) (97% - 100%)    Parameters below as of 21 Aug 2024 17:00  Patient On (Oxygen Delivery Method): room air        PHYSICAL EXAM:     GENERAL: NAD  HEAD:  Atraumatic, Normocephalic  EYES: EOMI, conjunctiva and sclera clear, no nystagmus noted  ENT: Moist mucous membranes  CHEST/LUNG: normal work of breathing, Clear to auscultation bilaterally; No rales, rhonchi, wheezing, or rubs. Unlabored respirations  HEART:  Regular rate and rhythm; No murmurs, rubs, or gallops, normal S1/S2  ABDOMEN: normal bowel sounds; Soft, nontender, nondistended, no organomegaly   EXTREMITIES:  no appreciable edema in b/l LE, 2+ Peripheral Pulses, brisk capillary refill. No clubbing, cyanosis  MSK: No gross deformities noted, ROM wnl   Neurological:  A&Ox3, no focal deficits   SKIN: warm and dry, no visible rash  PSYCH: Normal mood, affect         LABS:                        8.4    14.28 )-----------( 429      ( 21 Aug 2024 05:30 )             24.8     08-21    139  |  109<H>  |  33<H>  ----------------------------<  138<H>  3.9   |  18<L>  |  2.01<H>    Ca    8.6      21 Aug 2024 05:30  Phos  3.2     08-21  Mg     1.80     08-21                Tele Reviewed:    RADIOLOGY & ADDITIONAL TESTS:  Results Reviewed: yes  Imaging Personally Reviewed: yes  Electrocardiogram Personally Reviewed: yes

## 2024-08-21 NOTE — PROGRESS NOTE ADULT - PROBLEM SELECTOR PLAN 3
SCr 2.5 on admission, NATIVIDAD on CKD vs progression of CKD, prior baseline 1.8 in 1/2023. With NAGMA, suspect due to NATIVIDAD. likely secondary to severe anemia   - hold home ARB  - trend, avoid nephrotoxic agents, renally dose meds SCr 2.5 on admission, NATIVIDAD on CKD vs progression of CKD, prior baseline 1.8 in 1/2023. With NAGMA, suspect due to NATIVIDAD. likely due to comorbidities /AOCD vs medications/pain meds vs infection /PNA vs dehydration vs MGUS/MM light chain deposition vs  severe anemia   - hold home ARB  - trend, avoid nephrotoxic agents, renally dose meds  - f/u urine studies  - f/u MRI abdomen. r/o metastatic disease, PET 3/24 with left renal nodule

## 2024-08-21 NOTE — PROGRESS NOTE ADULT - PROBLEM SELECTOR PLAN 2
h/o R lung adenocarcinoma s/p RUL wedge resection with leukocytosis and imaging suggestive of RLL pneumonia, however pt without any symptoms   - CT chest with RLL consolidation and multifocal tree-in-bud opacities suggestive of pneumonia with endobronchial spread vs impacted distal airways  - will empirically treat for CAP for now with CTX and azithromycin  - repeat CT chest outpatient in 6 weeks to ensure resolution  - f/u blood cxs h/o R lung adenocarcinoma s/p RUL wedge resection with leukocytosis ,CT with consolidation vs impacted airways, asymptomatic, current smoker  - PET scan 3/24 GGO due to chronic infection. LLL mass- needs monitoring  - c/w empirically treat for CAP  with CTX and azithromycin  - repeat CT chest outpatient in 6 weeks to ensure resolution  -  blood cxs neg, leukocytosis improving

## 2024-08-21 NOTE — PROGRESS NOTE ADULT - PROBLEM SELECTOR PLAN 8
Diet: DASH  DVT ppx: heparin SQ  Dispo: pending clinical course

## 2024-08-21 NOTE — PROGRESS NOTE ADULT - SUBJECTIVE AND OBJECTIVE BOX
Patient seen and evaluated today.  Endorses last bm was about a week ago and was dark.  Hb dropped to 6.9 yesterday. He did not have bm. He is s/p 1 unit of blood.   Hb this morning is 8.4. He is afebrile. Cx negative.        OBJECTIVE:    VITAL SIGNS:  ICU Vital Signs Last 24 Hrs  T(C): 36.7 (21 Aug 2024 13:03), Max: 37.1 (20 Aug 2024 21:25)  T(F): 98 (21 Aug 2024 13:03), Max: 98.7 (20 Aug 2024 21:25)  HR: 70 (21 Aug 2024 13:03) (70 - 81)  BP: 137/68 (21 Aug 2024 13:03) (137/68 - 146/72)  BP(mean): --  ABP: --  ABP(mean): --  RR: 17 (21 Aug 2024 13:03) (16 - 18)  SpO2: 100% (21 Aug 2024 13:03) (97% - 100%)    O2 Parameters below as of 21 Aug 2024 13:03  Patient On (Oxygen Delivery Method): room air        PHYSICAL EXAM:    General: NAD  HEENT: NC/AT  Neck: supple  Respiratory: Regular RR, no increase in WOB  Cardiovascular: RRR, s1 S2  Abdomen: soft, NT/ND; +BS x4  Extremities: No pedal edema  Skin: normal color and turgor; no rash  Neurological: A&OX 3    MEDICATIONS:  MEDICATIONS  (STANDING):  atorvastatin 40 milliGRAM(s) Oral at bedtime  bisacodyl Suppository 10 milliGRAM(s) Rectal once  cefTRIAXone   IVPB 1000 milliGRAM(s) IV Intermittent every 24 hours  chlorhexidine 2% Cloths 1 Application(s) Topical daily  heparin   Injectable 5000 Unit(s) SubCutaneous every 8 hours  lidocaine   4% Patch 1 Patch Transdermal daily  naloxone Injectable 0.4 milliGRAM(s) IV Push once  pantoprazole    Tablet 40 milliGRAM(s) Oral before breakfast  polyethylene glycol 3350 17 Gram(s) Oral daily  polyethylene glycol/electrolyte Solution. 4000 milliLiter(s) Oral once  rOPINIRole 4 milliGRAM(s) Oral two times a day  senna 2 Tablet(s) Oral at bedtime    MEDICATIONS  (PRN):  aluminum hydroxide/magnesium hydroxide/simethicone Suspension 30 milliLiter(s) Oral every 4 hours PRN Dyspepsia  bisacodyl 5 milliGRAM(s) Oral daily PRN Constipation  melatonin 3 milliGRAM(s) Oral at bedtime PRN Insomnia  ondansetron Injectable 4 milliGRAM(s) IV Push every 8 hours PRN Nausea and/or Vomiting  oxyCODONE    IR 10 milliGRAM(s) Oral every 4 hours PRN Severe Pain (7 - 10)  oxyCODONE    IR 5 milliGRAM(s) Oral every 4 hours PRN Moderate Pain (4 - 6)      ALLERGIES:  Allergies    No Known Allergies    Intolerances        LABS:                        8.4    14.28 )-----------( 429      ( 21 Aug 2024 05:30 )             24.8     08-21    139  |  109<H>  |  33<H>  ----------------------------<  138<H>  3.9   |  18<L>  |  2.01<H>    Ca    8.6      21 Aug 2024 05:30  Phos  3.2     08-21  Mg     1.80     08-21        Urinalysis Basic - ( 21 Aug 2024 05:30 )    Color: x / Appearance: x / SG: x / pH: x  Gluc: 138 mg/dL / Ketone: x  / Bili: x / Urobili: x   Blood: x / Protein: x / Nitrite: x   Leuk Esterase: x / RBC: x / WBC x   Sq Epi: x / Non Sq Epi: x / Bacteria: x

## 2024-08-22 VITALS
RESPIRATION RATE: 17 BRPM | HEART RATE: 73 BPM | DIASTOLIC BLOOD PRESSURE: 79 MMHG | TEMPERATURE: 98 F | SYSTOLIC BLOOD PRESSURE: 146 MMHG | OXYGEN SATURATION: 98 %

## 2024-08-22 LAB
% ALBUMIN: 47.6 % — SIGNIFICANT CHANGE UP
% ALPHA 1: 4.8 % — SIGNIFICANT CHANGE UP
% ALPHA 2: 15.6 % — SIGNIFICANT CHANGE UP
% BETA: 12.7 % — SIGNIFICANT CHANGE UP
% GAMMA: 19.4 % — SIGNIFICANT CHANGE UP
% M SPIKE: 3.2 % — SIGNIFICANT CHANGE UP
ALBUMIN SERPL ELPH-MCNC: 3.05 G/DL — LOW (ref 3.3–4.4)
ALBUMIN SERPL ELPH-MCNC: 3.3 G/DL — SIGNIFICANT CHANGE UP (ref 3.3–5)
ALBUMIN/GLOB SERPL ELPH: 0.9 RATIO — SIGNIFICANT CHANGE UP
ALP SERPL-CCNC: 58 U/L — SIGNIFICANT CHANGE UP (ref 40–120)
ALPHA1 GLOB SERPL ELPH-MCNC: 0.31 G/DL — HIGH (ref 0.1–0.3)
ALPHA2 GLOB SERPL ELPH-MCNC: 1 G/DL — SIGNIFICANT CHANGE UP (ref 0.6–1)
ALT FLD-CCNC: 9 U/L — SIGNIFICANT CHANGE UP (ref 4–41)
ANION GAP SERPL CALC-SCNC: 13 MMOL/L — SIGNIFICANT CHANGE UP (ref 7–14)
ANION GAP SERPL CALC-SCNC: 14 MMOL/L — SIGNIFICANT CHANGE UP (ref 7–14)
APTT BLD: 27.4 SEC — SIGNIFICANT CHANGE UP (ref 24.5–35.6)
AST SERPL-CCNC: 15 U/L — SIGNIFICANT CHANGE UP (ref 4–40)
B-GLOBULIN SERPL ELPH-MCNC: 0.81 G/DL — SIGNIFICANT CHANGE UP (ref 0.6–1.1)
BASOPHILS # BLD AUTO: 0.05 K/UL — SIGNIFICANT CHANGE UP (ref 0–0.2)
BASOPHILS NFR BLD AUTO: 0.3 % — SIGNIFICANT CHANGE UP (ref 0–2)
BILIRUB SERPL-MCNC: 0.2 MG/DL — SIGNIFICANT CHANGE UP (ref 0.2–1.2)
BUN SERPL-MCNC: 29 MG/DL — HIGH (ref 7–23)
BUN SERPL-MCNC: 30 MG/DL — HIGH (ref 7–23)
CALCIUM SERPL-MCNC: 8.8 MG/DL — SIGNIFICANT CHANGE UP (ref 8.4–10.5)
CALCIUM SERPL-MCNC: 9 MG/DL — SIGNIFICANT CHANGE UP (ref 8.4–10.5)
CHLORIDE SERPL-SCNC: 106 MMOL/L — SIGNIFICANT CHANGE UP (ref 98–107)
CHLORIDE SERPL-SCNC: 106 MMOL/L — SIGNIFICANT CHANGE UP (ref 98–107)
CO2 SERPL-SCNC: 20 MMOL/L — LOW (ref 22–31)
CO2 SERPL-SCNC: 21 MMOL/L — LOW (ref 22–31)
CREAT SERPL-MCNC: 1.81 MG/DL — HIGH (ref 0.5–1.3)
CREAT SERPL-MCNC: 1.9 MG/DL — HIGH (ref 0.5–1.3)
EGFR: 36 ML/MIN/1.73M2 — LOW
EGFR: 38 ML/MIN/1.73M2 — LOW
EOSINOPHIL # BLD AUTO: 0.75 K/UL — HIGH (ref 0–0.5)
EOSINOPHIL NFR BLD AUTO: 4.5 % — SIGNIFICANT CHANGE UP (ref 0–6)
GAMMA GLOBULIN: 1.24 G/DL — SIGNIFICANT CHANGE UP (ref 0.7–1.7)
GLUCOSE SERPL-MCNC: 105 MG/DL — HIGH (ref 70–99)
GLUCOSE SERPL-MCNC: 109 MG/DL — HIGH (ref 70–99)
HCT VFR BLD CALC: 26.7 % — LOW (ref 39–50)
HCT VFR BLD CALC: 27.1 % — LOW (ref 39–50)
HGB BLD-MCNC: 9.2 G/DL — LOW (ref 13–17)
HGB BLD-MCNC: 9.2 G/DL — LOW (ref 13–17)
IANC: 10.86 K/UL — HIGH (ref 1.8–7.4)
IGA FLD-MCNC: 630 MG/DL — HIGH (ref 84–499)
IGG FLD-MCNC: 804 MG/DL — SIGNIFICANT CHANGE UP (ref 610–1660)
IGM SERPL-MCNC: 32 MG/DL — LOW (ref 35–242)
IMM GRANULOCYTES NFR BLD AUTO: 0.4 % — SIGNIFICANT CHANGE UP (ref 0–0.9)
INR BLD: 1.04 RATIO — SIGNIFICANT CHANGE UP (ref 0.85–1.18)
KAPPA LC SER QL IFE: 4.79 MG/DL — HIGH (ref 0.33–1.94)
KAPPA/LAMBDA FREE LIGHT CHAIN RATIO, SERUM: 0.73 RATIO — SIGNIFICANT CHANGE UP (ref 0.26–1.65)
LAMBDA LC SER QL IFE: 6.57 MG/DL — HIGH (ref 0.57–2.63)
LEGIONELLA AG UR QL: NEGATIVE — SIGNIFICANT CHANGE UP
LYMPHOCYTES # BLD AUTO: 16.5 % — SIGNIFICANT CHANGE UP (ref 13–44)
LYMPHOCYTES # BLD AUTO: 2.76 K/UL — SIGNIFICANT CHANGE UP (ref 1–3.3)
M-SPIKE: 0.2 G/DL — HIGH (ref 0–0)
MAGNESIUM SERPL-MCNC: 1.7 MG/DL — SIGNIFICANT CHANGE UP (ref 1.6–2.6)
MAGNESIUM SERPL-MCNC: 1.7 MG/DL — SIGNIFICANT CHANGE UP (ref 1.6–2.6)
MCHC RBC-ENTMCNC: 29.6 PG — SIGNIFICANT CHANGE UP (ref 27–34)
MCHC RBC-ENTMCNC: 29.9 PG — SIGNIFICANT CHANGE UP (ref 27–34)
MCHC RBC-ENTMCNC: 33.9 GM/DL — SIGNIFICANT CHANGE UP (ref 32–36)
MCHC RBC-ENTMCNC: 34.5 GM/DL — SIGNIFICANT CHANGE UP (ref 32–36)
MCV RBC AUTO: 86.7 FL — SIGNIFICANT CHANGE UP (ref 80–100)
MCV RBC AUTO: 87.1 FL — SIGNIFICANT CHANGE UP (ref 80–100)
MONOCYTES # BLD AUTO: 2.26 K/UL — HIGH (ref 0–0.9)
MONOCYTES NFR BLD AUTO: 13.5 % — SIGNIFICANT CHANGE UP (ref 2–14)
NEUTROPHILS # BLD AUTO: 10.86 K/UL — HIGH (ref 1.8–7.4)
NEUTROPHILS NFR BLD AUTO: 64.8 % — SIGNIFICANT CHANGE UP (ref 43–77)
NRBC # BLD: 0 /100 WBCS — SIGNIFICANT CHANGE UP (ref 0–0)
NRBC # BLD: 0 /100 WBCS — SIGNIFICANT CHANGE UP (ref 0–0)
NRBC # FLD: 0.03 K/UL — HIGH (ref 0–0)
NRBC # FLD: 0.03 K/UL — HIGH (ref 0–0)
PHOSPHATE SERPL-MCNC: 3.4 MG/DL — SIGNIFICANT CHANGE UP (ref 2.5–4.5)
PHOSPHATE SERPL-MCNC: 3.4 MG/DL — SIGNIFICANT CHANGE UP (ref 2.5–4.5)
PLATELET # BLD AUTO: 524 K/UL — HIGH (ref 150–400)
PLATELET # BLD AUTO: 534 K/UL — HIGH (ref 150–400)
POTASSIUM SERPL-MCNC: 4.2 MMOL/L — SIGNIFICANT CHANGE UP (ref 3.5–5.3)
POTASSIUM SERPL-MCNC: 4.2 MMOL/L — SIGNIFICANT CHANGE UP (ref 3.5–5.3)
POTASSIUM SERPL-SCNC: 4.2 MMOL/L — SIGNIFICANT CHANGE UP (ref 3.5–5.3)
POTASSIUM SERPL-SCNC: 4.2 MMOL/L — SIGNIFICANT CHANGE UP (ref 3.5–5.3)
PROT PATTERN SERPL ELPH-IMP: SIGNIFICANT CHANGE UP
PROT SERPL-MCNC: 6.4 G/DL — SIGNIFICANT CHANGE UP (ref 6–8.3)
PROT SERPL-MCNC: 6.8 G/DL — SIGNIFICANT CHANGE UP (ref 6–8.3)
PROTHROM AB SERPL-ACNC: 11.7 SEC — SIGNIFICANT CHANGE UP (ref 9.5–13)
RBC # BLD: 3.08 M/UL — LOW (ref 4.2–5.8)
RBC # BLD: 3.11 M/UL — LOW (ref 4.2–5.8)
RBC # FLD: 16.4 % — HIGH (ref 10.3–14.5)
RBC # FLD: 16.4 % — HIGH (ref 10.3–14.5)
SODIUM SERPL-SCNC: 140 MMOL/L — SIGNIFICANT CHANGE UP (ref 135–145)
SODIUM SERPL-SCNC: 140 MMOL/L — SIGNIFICANT CHANGE UP (ref 135–145)
WBC # BLD: 16.75 K/UL — HIGH (ref 3.8–10.5)
WBC # BLD: 17.1 K/UL — HIGH (ref 3.8–10.5)
WBC # FLD AUTO: 16.75 K/UL — HIGH (ref 3.8–10.5)
WBC # FLD AUTO: 17.1 K/UL — HIGH (ref 3.8–10.5)

## 2024-08-22 PROCEDURE — 99232 SBSQ HOSP IP/OBS MODERATE 35: CPT

## 2024-08-22 RX ORDER — ONDANSETRON 2 MG/ML
4 INJECTION, SOLUTION INTRAMUSCULAR; INTRAVENOUS ONCE
Refills: 0 | Status: COMPLETED | OUTPATIENT
Start: 2024-08-22 | End: 2024-08-22

## 2024-08-22 RX ADMIN — Medication 1 PATCH: at 12:18

## 2024-08-22 RX ADMIN — OXYCODONE HYDROCHLORIDE 15 MILLIGRAM(S): 5 TABLET ORAL at 12:52

## 2024-08-22 RX ADMIN — CHLORHEXIDINE GLUCONATE 1 APPLICATION(S): 40 SOLUTION TOPICAL at 12:24

## 2024-08-22 RX ADMIN — OXYCODONE HYDROCHLORIDE 15 MILLIGRAM(S): 5 TABLET ORAL at 13:52

## 2024-08-22 RX ADMIN — ONDANSETRON 4 MILLIGRAM(S): 2 INJECTION, SOLUTION INTRAMUSCULAR; INTRAVENOUS at 08:41

## 2024-08-22 NOTE — PHYSICAL THERAPY INITIAL EVALUATION ADULT - PLANNED THERAPY INTERVENTIONS, PT EVAL
Patient left positioned for safety in bed in NAD, call bell in reach, all lines intact. Fiance at bedside. JONAS Martinez aware./bed mobility training/gait training/strengthening/transfer training

## 2024-08-22 NOTE — PROGRESS NOTE ADULT - PROBLEM SELECTOR PLAN 3
SCr 2.5 on admission, NATIVIDAD on CKD vs progression of CKD, prior baseline 1.8 in 1/2023. With NAGMA, suspect due to NATIVIDAD. likely due to comorbidities /AOCD vs medications/pain meds vs infection /PNA vs dehydration vs MGUS/MM light chain deposition vs  severe anemia   - hold home ARB  - trend, avoid nephrotoxic agents, renally dose meds  - f/u urine studies  - f/u MRI abdomen. r/o metastatic disease, PET 3/24 with left renal nodule SCr 2.5 on admission, NATIVIDAD on CKD vs progression of CKD, prior baseline 1.8 in 1/2023.   - likely due to comorbidities /AOCD vs medications/pain meds vs infection /PNA vs dehydration vs MGUS /MM light chain deposition   - hold home ARB  - trend, avoid nephrotoxic agents, renally dose meds  - urine studies pos for intrinsic kidney dz  - f/u MRI abdomen-  r/o metastatic disease, PET 3/24 with left renal nodule

## 2024-08-22 NOTE — PROGRESS NOTE ADULT - PROBLEM SELECTOR PLAN 1
p/w fatigue x days, with Hgb 4.4 on admission, likely chronic in nature. HDS  - likely due to MGUS vs GI blood loss  - s/p 2u pRBC with appropriate response   - no melena/hematochezia. +FOBT in ED. 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 consider skeletal survey p/w fatigue x days, with Hgb 4.4 on admission, HDS.  likely chronic in nature.   - likely due to MGUS vs GI blood loss  - s/p 2u pRBC with appropriate response   - no melena/hematochezia. +FOBT in ED. 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 consider skeletal survey

## 2024-08-22 NOTE — PROGRESS NOTE ADULT - NUTRITIONAL ASSESSMENT
This patient has been assessed with a concern for Malnutrition and has been determined to have a diagnosis/diagnoses of Severe protein-calorie malnutrition and Underweight (BMI < 19).    This patient is being managed with:   Diet NPO after Midnight-     NPO Start Date: 21-Aug-2024   NPO Start Time: 23:59  Entered: Aug 21 2024  1:58PM    Diet Clear Liquid-  Entered: Aug 21 2024  1:30PM  
This patient has been assessed with a concern for Malnutrition and has been determined to have a diagnosis/diagnoses of Severe protein-calorie malnutrition and Underweight (BMI < 19).    This patient is being managed with:   Diet DASH/TLC-  Sodium & Cholesterol Restricted  Supplement Feeding Modality:  Oral  Ensure Plus High Protein Cans or Servings Per Day:  2       Frequency:  Daily  Entered: Aug 19 2024  5:52PM  
This patient has been assessed with a concern for Malnutrition and has been determined to have a diagnosis/diagnoses of Severe protein-calorie malnutrition and Underweight (BMI < 19).    This patient is being managed with:   Diet DASH/TLC-  Sodium & Cholesterol Restricted  Supplement Feeding Modality:  Oral  Ensure Plus High Protein Cans or Servings Per Day:  2       Frequency:  Daily  Entered: Aug 19 2024  5:52PM  
This patient has been assessed with a concern for Malnutrition and has been determined to have a diagnosis/diagnoses of Severe protein-calorie malnutrition and Underweight (BMI < 19).    This patient is being managed with:   Diet NPO after Midnight-     NPO Start Date: 21-Aug-2024   NPO Start Time: 23:59  Entered: Aug 21 2024  1:58PM    Diet Clear Liquid-  Entered: Aug 21 2024  1:30PM  
This patient has been assessed with a concern for Malnutrition and has been determined to have a diagnosis/diagnoses of Severe protein-calorie malnutrition and Underweight (BMI < 19).    This patient is being managed with:   Diet DASH/TLC-  Sodium & Cholesterol Restricted  Supplement Feeding Modality:  Oral  Ensure Plus High Protein Cans or Servings Per Day:  2       Frequency:  Daily  Entered: Aug 19 2024  5:52PM  
This patient has been assessed with a concern for Malnutrition and has been determined to have a diagnosis/diagnoses of Severe protein-calorie malnutrition and Underweight (BMI < 19).    This patient is being managed with:   Diet NPO after Midnight-     NPO Start Date: 21-Aug-2024   NPO Start Time: 23:59  Entered: Aug 21 2024  1:58PM    Diet Clear Liquid-  Entered: Aug 21 2024  1:30PM

## 2024-08-22 NOTE — PROGRESS NOTE ADULT - PROBLEM SELECTOR PLAN 2
h/o R lung adenocarcinoma s/p RUL wedge resection with leukocytosis ,CT with consolidation vs impacted airways, asymptomatic, current smoker  - PET scan 3/24 GGO due to chronic infection. LLL mass- needs monitoring  - c/w empirically treat for CAP  with CTX and azithromycin  - repeat CT chest outpatient in 6 weeks to ensure resolution  -  blood cxs neg, leukocytosis improving

## 2024-08-22 NOTE — PROGRESS NOTE ADULT - ASSESSMENT
78M with PMH HTN, chronic pain back, and R lung adenocarcinoma s/p RUL wedge resection presenting for worsening fatigue over the past 3 days. Also reported history of dark stool about 1 week prior to presentation.     GI procedures    - Colonoscopy in 2019 5mm polyps in the ascending colon and sigmoid colon.   - EGD in 2019 with gastritis with mild pyloric stenosis s/p botox injection (CTAP without gastric distension)     Assessment and plan       #Iron deficiency anemia  Differentials could be gastritis, pud, angioectasia, slow bleeding diverticuli r/o malignancy  #Right lobar pneumonia: responding to abx      Recommendations:     - Continue to monitor Hb with goal hb of >7.   - Will post pone Egd/colonoscopy till tomorrow to allow adequate bowel prep.   - Please give antiemetic to help with nausea  - Continue clear liquid diet for today.    - Replace electrolytes.  - Get preprocedure labs for tomorrow - cbc, bmp, mg, phos  - Rest of care per primary team     All recommendations are tentative until note is attested by attending. \        Heide Mena, PGY4  Gastroenterology and Hepatology  Available on TEAMS    For non urgent consult, please email [email or web address] (For [place]) or [email or web address] (For [facility])  Long range page number [phone]. Short range [phone]

## 2024-08-22 NOTE — PHYSICAL THERAPY INITIAL EVALUATION ADULT - PERTINENT HX OF CURRENT PROBLEM, REHAB EVAL
As per chart, patient is a 78 year old male with PMH HTN, chronic pain back, and R lung adenocarcinoma s/p RUL wedge resection presenting for worsening fatigue over the past 3 days. Also reported history of dark stool about 1 week prior to presentation. As per chart, patient is a 78 year old male with PMH HTN, chronic pain back, and right lung adenocarcinoma s/p RUL wedge resection presenting for worsening fatigue over the past 3 days. Also reported history of dark stool about 1 week prior to presentation.

## 2024-08-22 NOTE — PROGRESS NOTE ADULT - SUBJECTIVE AND OBJECTIVE BOX
Patient seen and evaluated.  He did not finish his bowel prep. Drank only 1/2 due to nausea.  Patient seen and evaluated.  He did not finish his bowel prep. Drank only 1/2 due to nausea.   wbc is fluctuating However remains afebrile and is on RA.  Hb is stable.         OBJECTIVE:    VITAL SIGNS:  ICU Vital Signs Last 24 Hrs  T(C): 36.9 (22 Aug 2024 04:59), Max: 36.9 (21 Aug 2024 21:06)  T(F): 98.4 (22 Aug 2024 04:59), Max: 98.5 (21 Aug 2024 21:06)  HR: 80 (22 Aug 2024 04:59) (70 - 86)  BP: 157/70 (22 Aug 2024 04:59) (137/68 - 157/70)  BP(mean): --  ABP: --  ABP(mean): --  RR: 16 (22 Aug 2024 04:59) (16 - 17)  SpO2: 99% (22 Aug 2024 04:59) (99% - 100%)    O2 Parameters below as of 22 Aug 2024 04:59  Patient On (Oxygen Delivery Method): room air                PHYSICAL EXAM:    General: NAD  HEENT: NC/AT  Neck: supple  Respiratory: Regular RR, no increase in WOB  Cardiovascular: RRR, S1, S2  Abdomen: soft, NT/ND; +BS x4  Extremities: WWP, 2+ peripheral pulses b/l  Skin: normal color and turgor; no rash  Neurological: A&OX 3    MEDICATIONS:  MEDICATIONS  (STANDING):  atorvastatin 40 milliGRAM(s) Oral at bedtime  cefTRIAXone   IVPB 1000 milliGRAM(s) IV Intermittent every 24 hours  chlorhexidine 2% Cloths 1 Application(s) Topical daily  heparin   Injectable 5000 Unit(s) SubCutaneous every 8 hours  lidocaine   4% Patch 1 Patch Transdermal daily  naloxone Injectable 0.4 milliGRAM(s) IV Push once  pantoprazole    Tablet 40 milliGRAM(s) Oral before breakfast  polyethylene glycol 3350 17 Gram(s) Oral daily  rOPINIRole 4 milliGRAM(s) Oral two times a day  senna 2 Tablet(s) Oral at bedtime    MEDICATIONS  (PRN):  aluminum hydroxide/magnesium hydroxide/simethicone Suspension 30 milliLiter(s) Oral every 4 hours PRN Dyspepsia  bisacodyl 5 milliGRAM(s) Oral daily PRN Constipation  melatonin 3 milliGRAM(s) Oral at bedtime PRN Insomnia  ondansetron Injectable 4 milliGRAM(s) IV Push every 8 hours PRN Nausea and/or Vomiting  oxyCODONE    IR 15 milliGRAM(s) Oral every 6 hours PRN Severe Pain (7 - 10)  oxyCODONE    IR 5 milliGRAM(s) Oral every 4 hours PRN Moderate Pain (4 - 6)      ALLERGIES:  Allergies    No Known Allergies    Intolerances        LABS:                        9.2    16.75 )-----------( 534      ( 22 Aug 2024 05:31 )             26.7     08-22    140  |  106  |  30<H>  ----------------------------<  109<H>  4.2   |  21<L>  |  1.90<H>    Ca    9.0      22 Aug 2024 05:31  Phos  3.4     08-22  Mg     1.70     08-22    TPro  6.8  /  Alb  3.3  /  TBili  0.2  /  DBili  x   /  AST  15  /  ALT  9   /  AlkPhos  58  08-22    PT/INR - ( 22 Aug 2024 05:31 )   PT: 11.7 sec;   INR: 1.04 ratio         PTT - ( 22 Aug 2024 05:31 )  PTT:27.4 sec  Urinalysis Basic - ( 22 Aug 2024 05:31 )    Color: x / Appearance: x / SG: x / pH: x  Gluc: 109 mg/dL / Ketone: x  / Bili: x / Urobili: x   Blood: x / Protein: x / Nitrite: x   Leuk Esterase: x / RBC: x / WBC x   Sq Epi: x / Non Sq Epi: x / Bacteria: x

## 2024-08-22 NOTE — PHYSICAL THERAPY INITIAL EVALUATION ADULT - GAIT DISTANCE, PT EVAL
patient requested to use bathroom to have a bowel movement; after having a bowel movement, patient deferred ambulating further due to fatigue/25 feet

## 2024-08-22 NOTE — PROGRESS NOTE ADULT - PROBLEM SELECTOR PLAN 3
SCr 2.5 on admission, NATIVIDAD on CKD vs progression of CKD, prior baseline 1.8 in 1/2023. With NAGMA, suspect due to NATIVIDAD. likely due to comorbidities /AOCD vs medications/pain meds vs infection /PNA vs dehydration vs MGUS/MM light chain deposition vs  severe anemia   - hold home ARB  - trend, avoid nephrotoxic agents, renally dose meds  - f/u urine studies  - f/u MRI abdomen. r/o metastatic disease, PET 3/24 with left renal nodule

## 2024-08-22 NOTE — CHART NOTE - NSCHARTNOTEFT_GEN_A_CORE
Pain consulted by primary team for pain not controlled with the current regimen. Patient ordered for Oxycodone 15mg q6h prn, no doses given based on EMR. Last given Oxycodone 10mg on 08/21/24 at 9:57am. Recommend pain assessments q4h to avoid gaps between doses for better pain control. Reconsult if pain not controlled after 24 hours of being on the current regimen.

## 2024-08-22 NOTE — PROGRESS NOTE ADULT - PROBLEM SELECTOR PLAN 1
p/w fatigue x days, with Hgb 4.4 on admission, likely chronic in nature. HDS  - likely due to MGUS vs GI blood loss  - s/p 2u pRBC with appropriate response   - no melena/hematochezia. +FOBT in ED. 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 consider skeletal survey

## 2024-08-22 NOTE — PROGRESS NOTE ADULT - PROBLEM SELECTOR PLAN 4
- worsening  - CT lumbar with moderate to severe central canal stenosis  - previously saw ortho-spine outpatient, currently being managed by ortho-pain management with vicodin 10-325mg q4h PRN (I-STOP#: [id number])  - c/w pain management  with tylenol PRN mild pain, oxy 5 PRN moderate, oxy 15PRN severe pain q4h  - f/u pain management  - f/u MRI lumbar spine, r/o cauda equina 2/2 cord compression  - outpatient follow up - worsening  - CT lumbar with moderate to severe central canal stenosis  - previously saw ortho-spine outpatient, currently being managed by ortho-pain management with vicodin 10-325mg q4h PRN (I-STOP#: [id number])  - c/w pain management  with tylenol PRN mild pain, oxy 5 PRN moderate, oxy 15PRN severe pain q4h  - pain management following- pain assessment q4h and administer PRN meds in timely fashion  - f/u MRI lumbar spine, r/o cauda equina 2/2 cord compression  - outpatient follow up

## 2024-08-22 NOTE — PROGRESS NOTE ADULT - PROBLEM SELECTOR PLAN 4
- worsening  - CT lumbar with moderate to severe central canal stenosis  - previously saw ortho-spine outpatient, currently being managed by ortho-pain management with vicodin 10-325mg q4h PRN (I-STOP#: [id number])  - c/w pain management  with tylenol PRN mild pain, oxy 5 PRN moderate, oxy 15PRN severe pain q4h  - f/u pain management  - f/u MRI lumbar spine, r/o cauda equina 2/2 cord compression  - outpatient follow up

## 2024-08-22 NOTE — PROGRESS NOTE ADULT - SUBJECTIVE AND OBJECTIVE BOX
PROGRESS NOTE:   Authored by Dr. Marilee Melendez  Patient is a 78y old  Male who presents with a chief complaint of 78M with PMH HTN, chronic pain back, and R lung adenocarcinoma s/p RUL wedge resection presenting for worsening fatigue over the past 3 days per chart   (19 Aug 2024 17:12)        OVERNIGHT EVENTS: No acute overnight events.    SUBJECTIVE: Patient was seen and examined at bedside this morning.   Denies any nausea/vomiting/diarrhea, headache, shortness of breath, abdominal pain or chest pain/palpitations.   Patient responding appropriately to questions and able to make needs known.   Vital signs/imaging/labs/telemetry events reviewed.   No acute complaints or concerns. Pt is feeling well.   Stating he feels fine.     All other ROS neg except as above       MEDICATIONS  (STANDING):  atorvastatin 40 milliGRAM(s) Oral at bedtime  cefTRIAXone   IVPB 1000 milliGRAM(s) IV Intermittent every 24 hours  chlorhexidine 2% Cloths 1 Application(s) Topical daily  heparin   Injectable 5000 Unit(s) SubCutaneous every 8 hours  lidocaine   4% Patch 1 Patch Transdermal daily  naloxone Injectable 0.4 milliGRAM(s) IV Push once  pantoprazole    Tablet 40 milliGRAM(s) Oral before breakfast  polyethylene glycol 3350 17 Gram(s) Oral daily  rOPINIRole 4 milliGRAM(s) Oral two times a day  senna 2 Tablet(s) Oral at bedtime    MEDICATIONS  (PRN):  aluminum hydroxide/magnesium hydroxide/simethicone Suspension 30 milliLiter(s) Oral every 4 hours PRN Dyspepsia  bisacodyl 5 milliGRAM(s) Oral daily PRN Constipation  melatonin 3 milliGRAM(s) Oral at bedtime PRN Insomnia  ondansetron Injectable 4 milliGRAM(s) IV Push every 8 hours PRN Nausea and/or Vomiting  oxyCODONE    IR 15 milliGRAM(s) Oral every 6 hours PRN Severe Pain (7 - 10)  oxyCODONE    IR 5 milliGRAM(s) Oral every 4 hours PRN Moderate Pain (4 - 6)      CAPILLARY BLOOD GLUCOSE        I&O's Summary      PHYSICAL EXAM:  Vital Signs Last 24 Hrs  T(C): 36.4 (22 Aug 2024 10:42), Max: 36.9 (21 Aug 2024 21:06)  T(F): 97.6 (22 Aug 2024 10:42), Max: 98.5 (21 Aug 2024 21:06)  HR: 73 (22 Aug 2024 10:42) (73 - 86)  BP: 146/79 (22 Aug 2024 10:42) (142/71 - 157/70)  BP(mean): --  RR: 17 (22 Aug 2024 10:42) (16 - 17)  SpO2: 98% (22 Aug 2024 10:42) (98% - 100%)    Parameters below as of 22 Aug 2024 10:42  Patient On (Oxygen Delivery Method): room air        PHYSICAL EXAM:     GENERAL: NAD  HEAD:  Atraumatic, Normocephalic  EYES: EOMI, conjunctiva and sclera clear, no nystagmus noted  ENT: Moist mucous membranes  CHEST/LUNG: normal work of breathing, Clear to auscultation bilaterally; No rales, rhonchi, wheezing, or rubs. Unlabored respirations  HEART:  Regular rate and rhythm; No murmurs, rubs, or gallops, normal S1/S2  ABDOMEN: normal bowel sounds; Soft, nontender, nondistended, no organomegaly   EXTREMITIES:  no appreciable edema in b/l LE, 2+ Peripheral Pulses, brisk capillary refill. No clubbing, cyanosis  MSK: No gross deformities noted, ROM wnl   Neurological:  A&Ox3, no focal deficits   SKIN: warm and dry, no visible rash  PSYCH: Normal mood, affect         LABS:                        9.2    16.75 )-----------( 534      ( 22 Aug 2024 05:31 )             26.7     08-22    140  |  106  |  30<H>  ----------------------------<  109<H>  4.2   |  21<L>  |  1.90<H>    Ca    9.0      22 Aug 2024 05:31  Phos  3.4     08-22  Mg     1.70     08-22    TPro  6.8  /  Alb  3.3  /  TBili  0.2  /  DBili  x   /  AST  15  /  ALT  9   /  AlkPhos  58  08-22    PT/INR - ( 22 Aug 2024 05:31 )   PT: 11.7 sec;   INR: 1.04 ratio         PTT - ( 22 Aug 2024 05:31 )  PTT:27.4 sec          Tele Reviewed:    RADIOLOGY & ADDITIONAL TESTS:  Results Reviewed: yes  Imaging Personally Reviewed: yes  Electrocardiogram Personally Reviewed: yes

## 2024-08-22 NOTE — PROGRESS NOTE ADULT - PROVIDER SPECIALTY LIST ADULT
Heme/Onc
Hospitalist
Gastroenterology
Hospitalist
Gastroenterology
Hospitalist

## 2024-08-22 NOTE — PROGRESS NOTE ADULT - ASSESSMENT
78M PMH HTN, chronic pain back, R lung adenocarcinoma s/p RUL wedge resection, active smoker, CKD, HLD, and restless leg syndrome, presenting with worsening fatigue x days, found to be severely anemic with hgb 4.4 s/p 2u pRBC. Imaging with RLL consolidation concerning for pneumonia, however, asymptomatic. Pt is being evaluated for possible causes of his anemia.

## 2024-08-22 NOTE — PROGRESS NOTE ADULT - ATTENDING COMMENTS
Patient seen/examined.  Assessment/recommendations as per GI fellow.  Plan for colonoscopy/EGD to evaluate anemia and decrease in hemoglobin.  Monitor serial hemoglobin/hematocrit.  Maintain hemoglobin >7 to 8 g.
78Mwith PMH HTN, chronic pain back, R lung adenocarcinoma s/p RUL wedge resection, CKD, HLD, and restless leg syndrome presenting with fatigue x days, found to be severely anemic with hgb 4.4 s/p 2u pRBC. Imaging with RLL consolidation concerning for pneumonia, however, asymptomatic.   #PNA: Continue Ceftriaxone, leukocytosis improved  #Anemia: s/p 2u PRBCs. retic 2.3. Prior h/o MGUS? Heme following. f/u SPEP. GI consulted, plan for endoscopy/colonoscopy tomorrow  #Back pain: Chronic and worsening. Denies any numbness or incontinence. CT showing moderate to severe stenosis. Pain not controlled with oxy, will titrate as needed for pain control. MRI L spine ordered   #Bilateral renal lesions: MRI abdomen ordered to further eval
78Mwith PMH HTN, chronic pain back, R lung adenocarcinoma s/p RUL wedge resection, CKD, HLD, and restless leg syndrome presenting with fatigue x days, found to be severely anemic with hgb 4.4 s/p 2u pRBC. Imaging with RLL consolidation concerning for pneumonia, however, asymptomatic.   #PNA: Continue Ceftraixone, leukocytosis improved  #Anemia: s/p 2u PRBCs. retic 2.3. Prior h/o MGUS? Heme following. f/u SPEP GI consulted, plan for endoscopy/colonoscopy tomorrow  #Back pain: Chronic and worsening. Denies any numbness or incontinence. CT showing moderate to severe stenosis. Pain not controlled with oxy, will titrate as needed for pain control. MRI L spine ordered   #Bilateral renal lesions: MRI abdomen ordered to further eval

## 2024-08-23 ENCOUNTER — TRANSCRIPTION ENCOUNTER (OUTPATIENT)
Age: 79
End: 2024-08-23

## 2024-08-23 DIAGNOSIS — J18.9 PNEUMONIA, UNSPECIFIED ORGANISM: ICD-10-CM

## 2024-08-23 LAB
CULTURE RESULTS: SIGNIFICANT CHANGE UP
CULTURE RESULTS: SIGNIFICANT CHANGE UP
SPECIMEN SOURCE: SIGNIFICANT CHANGE UP
SPECIMEN SOURCE: SIGNIFICANT CHANGE UP

## 2024-08-23 NOTE — DISCHARGE NOTE PROVIDER - NSFOLLOWUPCLINICS_GEN_ALL_ED_FT
Gastroenterology at [facility]  Gastroenterology  [address] [place] [address]  Phone: [phone]  Fax:   Follow Up Time: 1-3 days    [facility]  Nephrology  [address] [place] [address]  Phone: [phone]  Fax:   Follow Up Time: 1 week    [facility]  Hematology/Oncology  [address] [place] [address]  Phone: [phone]  Fax:   Follow Up Time: 1 week

## 2024-08-23 NOTE — DISCHARGE NOTE PROVIDER - HOSPITAL COURSE
HPI:  78M with PMH HTN, chronic pain back, and R lung adenocarcinoma s/p RUL wedge resection presenting for worsening fatigue over the past 3 days. Of note, pt is a poor historian, will answer specific questions with brief replies but not volunteering any further information. Denies having any abdominal pain, nausea, vomiting, or decreased PO intake. States that he came to the hospital because he was tired. Denies any chest pain, dizziness, syncope, fevers/chills, shortness of breath, cough, URI sxs, melena, hematochezia. Does not recall ever being told he's anemic. States he did receive blood transfusion in the remote past "during [place]", have not received any since. Had a colonoscopy <5 yrs ago but does not know results.    In ED, VS unremarkable, stable. Labs notable for severe anemia with h/h 4.4/13.8, leukocytosis to 17k, SCr 2.5 (NATIVIDAD on CKD vs progression of CKD), NAGMA, negative lactate   CT chest with RLL consolidation and multifocal tree-in-bud opacities suggestive of pneumonia with endobronchial spread vs impacted distal airyways   CT lumbar with moderate to severe L3-4 central canal stenosis, moderate to severe b/l L5-S1 foraminal stenoses  Recieved 2u pRBC, 1L NS, acetaminophen 750mg IV x1, CTX, azithro, lidocaine patch, morphine 2mg IVP x1, morphine 4mg IVP x1, PPI 80mg IV x1, oxy 10, and senna    (19 Aug 2024 00:15)    Hospital Course: Pt was found to have anemia due to MGUS vs GI blood loss. Hgb 4.4, received 2u pRBC with appropriate response. Work up without evidence of hemolysis. Iron panel c/w BHARGAV and AOCD. Pt was started on PPI. Hematology was following for MM work up. GI was following. EGD and colonoscopy was scheduled, pt refused to drink bowel prep and left AMA. Pt had NATIVIDAD on CKD due to comorbidities /AOCD, medications/pain meds. infection /PNA, dehydration, and MGUS/MM light chain deposition. ARBi was held. Urine studies c/w intrinsic NATIVIDAD. Pt w/ renal lesions, MRI abdomen was ordered to check for mets. CT chest with consolidation vs impacted airways due to being active smoker, and chronic inflammatory changes. Pt was asymptomatic. He was empirically treated for PNA. Pt had worsening of his chronic back pain. CT with moderate to severe lumbar central stenosis. Pain was controlled with PRN oxycodone. MRI lumbar spine was ordered to r/o cauda equina 2/2 cord compression.     Pt signed AMA form and left the hospital. Support was provided.       Important Medication Changes and Reason:  none  Active or Pending Issues Requiring Follow-up:  hematology for MM/MGUS  GI for EGD, colonoscopy  Nephrology for NATIVIDAD on CKD, MRI abdomen for renal lesions  Pain management for MRI lumbar spine to eval for cord compression      Advanced Directives:   [ x] Full code  [ ] DNR  [ ] Hospice    Discharge Diagnoses:  anemia  bone marrow malignancy  NATIVIDAD on CKD  Lumbar stenosis      On day of discharge, patient is clinically stable with no new exam findings or acute symptoms compared to prior. The patient was seen by the attending physician on the date of discharge and deemed stable and acceptable for discharge. The patient's chronic medical conditions were treated accordingly per the patient's home medication regimen. The patient's medication reconciliation (with changes made to chronic medications), follow up appointments, discharge orders, instructions, and significant lab and diagnostic studies are as noted.

## 2024-08-23 NOTE — DISCHARGE NOTE PROVIDER - NSDCCPCAREPLAN_GEN_ALL_CORE_FT
PRINCIPAL DISCHARGE DIAGNOSIS  Diagnosis: Symptomatic anemia  Assessment and Plan of Treatment:       SECONDARY DISCHARGE DIAGNOSES  Diagnosis: Acute kidney injury superimposed on CKD  Assessment and Plan of Treatment: your kidney function was abnormal. follow up nephrology outpatient. you have lesions in your kidneys suspicious of metastatic disease. follow up MRI abdomen outpatient.    Diagnosis: Chronic back pain  Assessment and Plan of Treatment: you have chronic back pain because of the narrowing of your spinal canal. 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 follow up hematology outpatient.

## 2024-08-23 NOTE — DISCHARGE NOTE PROVIDER - NSDCFUADDAPPT_GEN_ALL_CORE_FT
APPTS ARE READY TO BE MADE: [x] YES    Best Family or Patient Contact (if needed): girlfriend    Additional Information about above appointments (if needed):    1: please follow up with your primary doctor   in 1-2 weeks   2: please follow up with your gastroenterologist   in 1-2 weeks   3: please follow up with your  nephrologist  in 1-2 weeks   4:  please follow up with your  pain specialist  in 1-2 weeks   5:  please follow up with your  hematologist  in 1-2 weeks       Other comments or requests:    APPTS ARE READY TO BE MADE: [x] YES    Best Family or Patient Contact (if needed): girlfriend    Additional Information about above appointments (if needed):    1: please follow up with your primary doctor   in 1-2 weeks   2: please follow up with your gastroenterologist   in 1-2 weeks   3: please follow up with your  nephrologist  in 1-2 weeks   4:  please follow up with your  pain specialist  in 1-2 weeks   5:  please follow up with your  hematologist  in 1-2 weeks       Other comments or requests:     Patient was outreached, however they advised they were readmitted to the hospital.

## 2024-08-23 NOTE — DISCHARGE NOTE PROVIDER - NSDCFUSCHEDAPPT_GEN_ALL_CORE_FT
Navin Arnett  [facility]  [address]  Scheduled Appointment: 08/27/2024     Navin Arnett  [facility]  [address]  Scheduled Appointment: 09/25/2024

## 2024-08-23 NOTE — DISCHARGE NOTE PROVIDER - NSDCMRMEDTOKEN_GEN_ALL_CORE_FT
hydrocodone-acetaminophen 10 mg-325 mg oral tablet: 1 tab(s) orally every 4 hours as needed for  pain  rOPINIRole 4 mg oral tablet: 1 tab(s) orally 2 times a day  rosuvastatin 10 mg oral tablet: 1 tab(s) orally

## 2024-08-23 NOTE — DISCHARGE NOTE PROVIDER - NSDCCPTREATMENT_GEN_ALL_CORE_FT
PRINCIPAL PROCEDURE  Procedure: CT abdomen  Findings and Treatment: IMPRESSION:  Right lower lobe consolidation and multifocal tree-in-bud opacities.   Findings may reflect pneumonia with endobronchial spread of infection or   impacted distal airways. Recommend follow-up CT chest in 6 weeks to   ensureresolution.  Bilateral indeterminate renal lesions. Recommend further evaluation with   contrast-enhanced MR abdomen for more definitive characterization.  --- End of Report ---        SECONDARY PROCEDURE  Procedure: CT chest wo con  Findings and Treatment: IMPRESSION:  Right lower lobe consolidation and multifocal tree-in-bud opacities.   Findings may reflect pneumonia with endobronchial spread of infection or   impacted distal airways. Recommend follow-up CT chest in 6 weeks to   ensureresolution.  Bilateral indeterminate renal lesions. Recommend further evaluation with   contrast-enhanced MR abdomen for more definitive characterization.  --- End of Report ---      Procedure: CT lumbar spine  Findings and Treatment: IMPRESSION:  1.  Moderate to severe L3-4 central canal stenosis, with moderate   narrowing at L2-3 and L4-5.  2.  Moderate to severe bilateral L5-S1 foraminal stenoses.  3.  Consider MRI to exclude cauda equina compression.  4.  Airspace opacity at the right lung base.

## 2024-08-23 NOTE — DISCHARGE NOTE PROVIDER - DETAILS OF MALNUTRITION DIAGNOSIS/DIAGNOSES
This patient has been assessed with a concern for Malnutrition and was treated during this hospitalization for the following Nutrition diagnosis/diagnoses:     -  08/19/2024: Severe protein-calorie malnutrition   -  08/19/2024: Underweight (BMI < 19)

## 2024-08-27 ENCOUNTER — APPOINTMENT (OUTPATIENT)
Dept: PAIN MANAGEMENT | Facility: CLINIC | Age: 79
End: 2024-08-27
Payer: MEDICARE

## 2024-08-27 DIAGNOSIS — M54.12 RADICULOPATHY, CERVICAL REGION: ICD-10-CM

## 2024-08-27 PROCEDURE — 99213 OFFICE O/P EST LOW 20 MIN: CPT

## 2024-08-28 LAB — INTERPRETATION SERPL IFE-IMP: SIGNIFICANT CHANGE UP

## 2024-08-29 NOTE — H&P PST ADULT - ADMIT DATE
30[birth year]Nov[birth year]2018 No changes    We discussed risks of surgery which include but are not limited to: anesthetic complication; pulmonary embolism; cardiac arrest; bleeding requiring blood transfusion; infection; CSF leak; damage to the spinal nerves and/or cord resulting in paralysis, loss of bowel, bladder or sexual function; failure to relieve pain; increase in pain; excessive scar tissue formation; reduction in the range of motion; failure of hardware; need for further surgery; and death.  All questions were answered and consent was obtained.

## 2024-09-25 ENCOUNTER — APPOINTMENT (OUTPATIENT)
Dept: PAIN MANAGEMENT | Facility: CLINIC | Age: 79
End: 2024-09-25
Payer: MEDICARE

## 2024-09-25 DIAGNOSIS — M54.12 RADICULOPATHY, CERVICAL REGION: ICD-10-CM

## 2024-09-25 DIAGNOSIS — M54.16 RADICULOPATHY, LUMBAR REGION: ICD-10-CM

## 2024-09-25 PROCEDURE — 99213 OFFICE O/P EST LOW 20 MIN: CPT

## 2024-09-25 NOTE — HISTORY OF PRESENT ILLNESS
[Lower back] : lower back [Gradual] : gradual [8] : 8 [6] : 6 [Dull/Aching] : dull/aching [Constant] : constant [Household chores] : household chores [Sleep] : sleep [Rest] : rest [Meds] : meds [Heat] : heat [Sitting] : sitting [Physical therapy] : physical therapy [Retired] : Work status: retired [FreeTextEntry1] : WHITNEY ABDULLAHI IS FOLLOWING UP FOR PAIN MED REFILL, THERE HAS NOT BEEN CHANGES IN PAIN SINCE LAST VISIT.   LAST UDS:02/21/2024   [] : This patient has had an injection before: no [de-identified] : MRI

## 2024-10-02 ENCOUNTER — APPOINTMENT (OUTPATIENT)
Dept: PAIN MANAGEMENT | Facility: CLINIC | Age: 79
End: 2024-10-02

## 2024-10-23 ENCOUNTER — APPOINTMENT (OUTPATIENT)
Dept: PAIN MANAGEMENT | Facility: CLINIC | Age: 79
End: 2024-10-23
Payer: MEDICARE

## 2024-10-23 DIAGNOSIS — M54.12 RADICULOPATHY, CERVICAL REGION: ICD-10-CM

## 2024-10-23 DIAGNOSIS — M54.16 RADICULOPATHY, LUMBAR REGION: ICD-10-CM

## 2024-10-23 PROCEDURE — 99213 OFFICE O/P EST LOW 20 MIN: CPT

## 2024-11-20 ENCOUNTER — APPOINTMENT (OUTPATIENT)
Dept: PAIN MANAGEMENT | Facility: CLINIC | Age: 79
End: 2024-11-20
Payer: MEDICARE

## 2024-11-20 DIAGNOSIS — M47.817 SPONDYLOSIS W/OUT MYELOPATHY OR RADICULOPATHY, LUMBOSACRAL REGION: ICD-10-CM

## 2024-11-20 DIAGNOSIS — M54.16 RADICULOPATHY, LUMBAR REGION: ICD-10-CM

## 2024-11-20 PROCEDURE — 99213 OFFICE O/P EST LOW 20 MIN: CPT

## 2024-12-05 NOTE — ASU PREOP CHECKLIST - HEIGHT IN INCHES
12/05/24    9:04 AM     Patient met with Dr. Erickson 5/2024 regarding an umbilical hernia. The patient elected for watchful waiting rather than undergoing robotic repair.    Patient called the Call Center and requested to speak with staff. Attempted to reach the patient with no answer. No VM.  Will try to reach the patient at a later time.    12/05/24    9:31 AM     Patient returned call and has concerns that his umbilical hernia has gotten larger and he would like to go to the ED. Discouraged ED/UC visit and can be evaluated in the clinic today with Dr. Erickson. Patient will be seen at 1145.   6

## 2024-12-18 ENCOUNTER — APPOINTMENT (OUTPATIENT)
Dept: PAIN MANAGEMENT | Facility: CLINIC | Age: 79
End: 2024-12-18
Payer: MEDICARE

## 2024-12-18 DIAGNOSIS — M54.12 RADICULOPATHY, CERVICAL REGION: ICD-10-CM

## 2024-12-18 PROCEDURE — 99213 OFFICE O/P EST LOW 20 MIN: CPT

## 2025-01-17 ENCOUNTER — APPOINTMENT (OUTPATIENT)
Dept: PAIN MANAGEMENT | Facility: CLINIC | Age: 80
End: 2025-01-17
Payer: MEDICARE

## 2025-01-17 DIAGNOSIS — M54.16 RADICULOPATHY, LUMBAR REGION: ICD-10-CM

## 2025-01-17 PROCEDURE — 99213 OFFICE O/P EST LOW 20 MIN: CPT

## 2025-02-11 ENCOUNTER — APPOINTMENT (OUTPATIENT)
Dept: PAIN MANAGEMENT | Facility: CLINIC | Age: 80
End: 2025-02-11
Payer: MEDICARE

## 2025-02-11 VITALS — WEIGHT: 130 LBS | HEIGHT: 66 IN | BODY MASS INDEX: 20.89 KG/M2

## 2025-02-11 DIAGNOSIS — M54.16 RADICULOPATHY, LUMBAR REGION: ICD-10-CM

## 2025-02-11 PROCEDURE — 99212 OFFICE O/P EST SF 10 MIN: CPT

## 2025-03-12 ENCOUNTER — APPOINTMENT (OUTPATIENT)
Dept: PAIN MANAGEMENT | Facility: CLINIC | Age: 80
End: 2025-03-12
Payer: MEDICARE

## 2025-03-12 DIAGNOSIS — M54.16 RADICULOPATHY, LUMBAR REGION: ICD-10-CM

## 2025-03-12 PROCEDURE — 99213 OFFICE O/P EST LOW 20 MIN: CPT

## 2025-04-09 ENCOUNTER — APPOINTMENT (OUTPATIENT)
Dept: PAIN MANAGEMENT | Facility: CLINIC | Age: 80
End: 2025-04-09
Payer: MEDICARE

## 2025-04-09 DIAGNOSIS — M54.16 RADICULOPATHY, LUMBAR REGION: ICD-10-CM

## 2025-04-09 PROCEDURE — 99213 OFFICE O/P EST LOW 20 MIN: CPT

## 2025-05-06 ENCOUNTER — APPOINTMENT (OUTPATIENT)
Dept: PAIN MANAGEMENT | Facility: CLINIC | Age: 80
End: 2025-05-06
Payer: MEDICARE

## 2025-05-06 DIAGNOSIS — M54.12 RADICULOPATHY, CERVICAL REGION: ICD-10-CM

## 2025-05-06 PROCEDURE — 99213 OFFICE O/P EST LOW 20 MIN: CPT

## 2025-05-12 NOTE — ASU PREOP CHECKLIST - NEEDLE GAUGE
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
22

## 2025-06-04 ENCOUNTER — APPOINTMENT (OUTPATIENT)
Dept: PAIN MANAGEMENT | Facility: CLINIC | Age: 80
End: 2025-06-04
Payer: MEDICARE

## 2025-06-04 DIAGNOSIS — M54.12 RADICULOPATHY, CERVICAL REGION: ICD-10-CM

## 2025-06-04 PROCEDURE — 99213 OFFICE O/P EST LOW 20 MIN: CPT

## 2025-07-02 ENCOUNTER — APPOINTMENT (OUTPATIENT)
Dept: PAIN MANAGEMENT | Facility: CLINIC | Age: 80
End: 2025-07-02
Payer: MEDICARE

## 2025-07-02 VITALS — WEIGHT: 129 LBS | HEIGHT: 66 IN | BODY MASS INDEX: 20.73 KG/M2

## 2025-07-02 PROCEDURE — 99213 OFFICE O/P EST LOW 20 MIN: CPT

## 2025-07-23 ENCOUNTER — APPOINTMENT (OUTPATIENT)
Dept: PAIN MANAGEMENT | Facility: CLINIC | Age: 80
End: 2025-07-23
Payer: MEDICARE

## 2025-07-23 DIAGNOSIS — M54.12 RADICULOPATHY, CERVICAL REGION: ICD-10-CM

## 2025-07-23 PROCEDURE — 99213 OFFICE O/P EST LOW 20 MIN: CPT

## 2025-08-20 ENCOUNTER — APPOINTMENT (OUTPATIENT)
Dept: PAIN MANAGEMENT | Facility: CLINIC | Age: 80
End: 2025-08-20
Payer: MEDICARE

## 2025-08-20 VITALS — HEIGHT: 66 IN | WEIGHT: 130 LBS | BODY MASS INDEX: 20.89 KG/M2

## 2025-08-20 DIAGNOSIS — M54.16 RADICULOPATHY, LUMBAR REGION: ICD-10-CM

## 2025-08-20 PROCEDURE — 99213 OFFICE O/P EST LOW 20 MIN: CPT

## 2025-09-17 ENCOUNTER — APPOINTMENT (OUTPATIENT)
Dept: PAIN MANAGEMENT | Facility: CLINIC | Age: 80
End: 2025-09-17
Payer: MEDICARE

## 2025-09-17 VITALS — HEIGHT: 66 IN | BODY MASS INDEX: 20.89 KG/M2 | WEIGHT: 130 LBS

## 2025-09-17 DIAGNOSIS — M54.16 RADICULOPATHY, LUMBAR REGION: ICD-10-CM

## 2025-09-17 PROCEDURE — 99213 OFFICE O/P EST LOW 20 MIN: CPT
